# Patient Record
Sex: MALE | Race: WHITE | NOT HISPANIC OR LATINO | Employment: FULL TIME | URBAN - METROPOLITAN AREA
[De-identification: names, ages, dates, MRNs, and addresses within clinical notes are randomized per-mention and may not be internally consistent; named-entity substitution may affect disease eponyms.]

---

## 2017-04-19 ENCOUNTER — TELEPHONE (OUTPATIENT)
Dept: GASTROENTEROLOGY | Facility: CLINIC | Age: 62
End: 2017-04-19

## 2017-04-19 NOTE — TELEPHONE ENCOUNTER
----- Message from Charisma Rodriguez MD sent at 4/19/2017 12:59 PM EDT -----  Regarding: recall  C/s recall 7/2021

## 2019-02-13 ENCOUNTER — OFFICE VISIT (OUTPATIENT)
Dept: FAMILY MEDICINE CLINIC | Facility: CLINIC | Age: 64
End: 2019-02-13

## 2019-02-13 VITALS
BODY MASS INDEX: 26.68 KG/M2 | SYSTOLIC BLOOD PRESSURE: 126 MMHG | HEIGHT: 67 IN | WEIGHT: 170 LBS | OXYGEN SATURATION: 98 % | DIASTOLIC BLOOD PRESSURE: 78 MMHG | TEMPERATURE: 98.3 F | HEART RATE: 73 BPM

## 2019-02-13 DIAGNOSIS — Z11.59 ENCOUNTER FOR HEPATITIS C SCREENING TEST FOR LOW RISK PATIENT: ICD-10-CM

## 2019-02-13 DIAGNOSIS — Z00.00 HEALTH MAINTENANCE EXAMINATION: Primary | ICD-10-CM

## 2019-02-13 DIAGNOSIS — E55.9 HYPOVITAMINOSIS D: ICD-10-CM

## 2019-02-13 DIAGNOSIS — Z13.1 SCREENING FOR DIABETES MELLITUS: ICD-10-CM

## 2019-02-13 DIAGNOSIS — Z23 NEED FOR PNEUMOCOCCAL VACCINE: ICD-10-CM

## 2019-02-13 DIAGNOSIS — Z13.220 SCREENING FOR LIPID DISORDERS: ICD-10-CM

## 2019-02-13 PROBLEM — Z51.6 ALLERGY DESENSITIZATION THERAPY: Status: ACTIVE | Noted: 2019-02-13

## 2019-02-13 PROBLEM — K21.9 GASTROESOPHAGEAL REFLUX DISEASE WITHOUT ESOPHAGITIS: Status: ACTIVE | Noted: 2019-02-13

## 2019-02-13 PROBLEM — J45.22 MILD INTERMITTENT ASTHMA WITH STATUS ASTHMATICUS: Status: ACTIVE | Noted: 2019-02-13

## 2019-02-13 PROCEDURE — 90670 PCV13 VACCINE IM: CPT | Performed by: FAMILY MEDICINE

## 2019-02-13 PROCEDURE — 99397 PER PM REEVAL EST PAT 65+ YR: CPT | Performed by: FAMILY MEDICINE

## 2019-02-13 PROCEDURE — 90471 IMMUNIZATION ADMIN: CPT | Performed by: FAMILY MEDICINE

## 2019-02-13 NOTE — PROGRESS NOTES
Vito Rodriguez is a 63 y.o. male.     Chief Complaint   Patient presents with   • Establish Care     new pt establishing today with dr colindres   • Annual Exam       HPI     Pt is a pleasant 63 y.o. YO male here for Annual Exam.  PMH includes GERD well controlled with last EGD in 2016, Allergies with immune therapy, Mild intermittent asthma well controlled on Symbicort.    Health Maintenance   Topic Date Due   • TDAP/TD VACCINES (1 - Tdap) 06/23/1972   • ZOSTER VACCINE (1 of 2) 06/23/2003   • HEPATITIS C SCREENING  04/19/2017   • PNEUMOCOCCAL VACCINES (65+ LOW/MEDIUM RISK) (2 of 2 - PPSV23) 02/13/2020   • ANNUAL PHYSICAL  02/14/2020   • COLONOSCOPY  07/06/2021   • INFLUENZA VACCINE  Completed     Diet: Over all wants to eat healthy  Exercise: Enjoys being active.     The following portions of the patient's history were reviewed and updated as appropriate: allergies, current medications, past family history, past medical history, past social history, past surgical history and problem list.    Review of Systems   Constitutional: Negative.    HENT: Positive for congestion and postnasal drip.    Eyes: Negative.    Respiratory: Positive for cough.    Cardiovascular: Negative for chest pain, palpitations and leg swelling.   Gastrointestinal: Negative.    Genitourinary: Negative.    Musculoskeletal: Negative.    Allergic/Immunologic: Positive for environmental allergies.   Neurological: Negative.    Hematological: Negative.    Psychiatric/Behavioral: Negative.        Objective  Vitals:    02/13/19 1331   BP: 126/78   Pulse: 73   Temp: 98.3 °F (36.8 °C)   SpO2: 98%        Physical Exam   Constitutional: He is oriented to person, place, and time. He appears well-developed and well-nourished. No distress.   HENT:   Head: Normocephalic.   Nose: Nose normal.   Eyes: EOM are normal.   Cardiovascular: Normal rate, regular rhythm, normal heart sounds and intact distal pulses.   No murmur heard.  Pulmonary/Chest: Effort normal and  breath sounds normal. No respiratory distress.   Musculoskeletal: Normal range of motion.   Neurological: He is alert and oriented to person, place, and time.   Skin: Skin is warm and dry. No rash noted.   Psychiatric: He has a normal mood and affect. His behavior is normal. Judgment and thought content normal.   Nursing note and vitals reviewed.        Current Outpatient Medications:   •  budesonide-formoterol (SYMBICORT) 80-4.5 MCG/ACT inhaler, Inhale 2 puffs daily., Disp: , Rfl:   •  lansoprazole (PREVACID) 30 MG capsule, Take 30 mg by mouth daily., Disp: , Rfl:   •  montelukast (SINGULAIR) 10 MG tablet, Take 10 mg by mouth every night., Disp: , Rfl:     Procedures    Lab Results (most recent)     None              Vito was seen today for establish care and annual exam.    Diagnoses and all orders for this visit:    Health maintenance examination    Screening for lipid disorders  -     Lipid Panel    Screening for diabetes mellitus  -     Comprehensive Metabolic Panel    Hypovitaminosis D  -     Vitamin D 25 Hydroxy    Encounter for hepatitis C screening test for low risk patient  -     Hepatitis C Antibody    Need for pneumococcal vaccine  -     Pneumococcal Conjugate Vaccine 13-Valent All (PCV13)      Labs as above, Not due for C-Scope, goal to increase water intake, eats healthy over all.  He does enjoy 30 min on the elliptical and stair master, bike.  Does not run with knee OA.  Needs PNA vaccine today.      Return if symptoms worsen or fail to improve.      Tyesha Ritchie MD

## 2019-02-14 LAB
25(OH)D3+25(OH)D2 SERPL-MCNC: 21.2 NG/ML (ref 30–100)
ALBUMIN SERPL-MCNC: 4.6 G/DL (ref 3.6–4.8)
ALBUMIN/GLOB SERPL: 1.8 {RATIO} (ref 1.2–2.2)
ALP SERPL-CCNC: 73 IU/L (ref 39–117)
ALT SERPL-CCNC: 17 IU/L (ref 0–44)
AST SERPL-CCNC: 25 IU/L (ref 0–40)
BILIRUB SERPL-MCNC: 0.3 MG/DL (ref 0–1.2)
BUN SERPL-MCNC: 12 MG/DL (ref 8–27)
BUN/CREAT SERPL: 12 (ref 10–24)
CALCIUM SERPL-MCNC: 9.4 MG/DL (ref 8.6–10.2)
CHLORIDE SERPL-SCNC: 101 MMOL/L (ref 96–106)
CHOLEST SERPL-MCNC: 199 MG/DL (ref 100–199)
CO2 SERPL-SCNC: 26 MMOL/L (ref 20–29)
CREAT SERPL-MCNC: 1.02 MG/DL (ref 0.76–1.27)
GLOBULIN SER CALC-MCNC: 2.6 G/DL (ref 1.5–4.5)
GLUCOSE SERPL-MCNC: 94 MG/DL (ref 65–99)
HCV AB S/CO SERPL IA: <0.1 S/CO RATIO (ref 0–0.9)
HDLC SERPL-MCNC: 41 MG/DL
LDLC SERPL CALC-MCNC: 132 MG/DL (ref 0–99)
POTASSIUM SERPL-SCNC: 4.7 MMOL/L (ref 3.5–5.2)
PROT SERPL-MCNC: 7.2 G/DL (ref 6–8.5)
SODIUM SERPL-SCNC: 142 MMOL/L (ref 134–144)
TRIGL SERPL-MCNC: 128 MG/DL (ref 0–149)
VLDLC SERPL CALC-MCNC: 26 MG/DL (ref 5–40)

## 2020-10-28 ENCOUNTER — OFFICE VISIT (OUTPATIENT)
Dept: FAMILY MEDICINE CLINIC | Facility: CLINIC | Age: 65
End: 2020-10-28

## 2020-10-28 VITALS
HEIGHT: 67 IN | BODY MASS INDEX: 27.03 KG/M2 | TEMPERATURE: 97.3 F | HEART RATE: 82 BPM | SYSTOLIC BLOOD PRESSURE: 128 MMHG | WEIGHT: 172.2 LBS | DIASTOLIC BLOOD PRESSURE: 72 MMHG

## 2020-10-28 DIAGNOSIS — Z13.220 SCREENING FOR LIPID DISORDERS: ICD-10-CM

## 2020-10-28 DIAGNOSIS — Z12.5 SCREENING PSA (PROSTATE SPECIFIC ANTIGEN): ICD-10-CM

## 2020-10-28 DIAGNOSIS — Z23 NEED FOR INFLUENZA VACCINATION: ICD-10-CM

## 2020-10-28 DIAGNOSIS — Z23 NEED FOR PNEUMOCOCCAL VACCINATION: ICD-10-CM

## 2020-10-28 DIAGNOSIS — Z00.00 HEALTH MAINTENANCE EXAMINATION: Primary | ICD-10-CM

## 2020-10-28 DIAGNOSIS — E55.9 HYPOVITAMINOSIS D: ICD-10-CM

## 2020-10-28 DIAGNOSIS — Z13.1 SCREENING FOR DIABETES MELLITUS: ICD-10-CM

## 2020-10-28 DIAGNOSIS — Z13.0 SCREENING, ANEMIA, DEFICIENCY, IRON: ICD-10-CM

## 2020-10-28 PROCEDURE — 90472 IMMUNIZATION ADMIN EACH ADD: CPT | Performed by: FAMILY MEDICINE

## 2020-10-28 PROCEDURE — 90471 IMMUNIZATION ADMIN: CPT | Performed by: FAMILY MEDICINE

## 2020-10-28 PROCEDURE — 99397 PER PM REEVAL EST PAT 65+ YR: CPT | Performed by: FAMILY MEDICINE

## 2020-10-28 PROCEDURE — 90732 PPSV23 VACC 2 YRS+ SUBQ/IM: CPT | Performed by: FAMILY MEDICINE

## 2020-10-28 PROCEDURE — 90694 VACC AIIV4 NO PRSRV 0.5ML IM: CPT | Performed by: FAMILY MEDICINE

## 2020-10-28 NOTE — PROGRESS NOTES
Vito Rodriguez is a 65 y.o. male.     Chief Complaint   Patient presents with   • Annual Exam     Patient is here for his yearly check up he is not fasting. He is past due to his second pneumonia vaccine ( wore mask and goggles)        HPI     Pt is a pleasant 65 y.o. YO male here for     Health Maintenance   Topic Date Due   • TDAP/TD VACCINES (1 - Tdap) 06/23/1972   • ZOSTER VACCINE (1 of 2) 06/23/2003   • INFLUENZA VACCINE  08/01/2020   • COLONOSCOPY  07/06/2021   • ANNUAL PHYSICAL  10/29/2021   • Pneumococcal Vaccine 65+ (2 of 2 - PPSV23) 10/28/2025   • HEPATITIS C SCREENING  Completed       Diet: eats healthy and a lot of veggies, chicken and fish  Exercise: active  Colon cancer UTD  Immunizations: Due for PNA and flu    Some chest pain, SOA, palpitations   Rare ETOH, no smoking    The following portions of the patient's history were reviewed and updated as appropriate: allergies, current medications, past family history, past medical history, past social history, past surgical history and problem list.    Review of Systems   Constitutional: Negative for activity change and appetite change.   HENT: Negative for congestion and dental problem.    Eyes: Negative for discharge and itching.   Respiratory: Negative for apnea and chest tightness.    Cardiovascular: Negative for chest pain, palpitations and leg swelling.   Gastrointestinal: Negative for abdominal distention and abdominal pain.   Endocrine: Negative for cold intolerance and heat intolerance.   Genitourinary: Negative for difficulty urinating and dysuria.   Musculoskeletal: Negative for arthralgias and back pain.   Skin: Negative for color change and pallor.   Allergic/Immunologic: Negative for environmental allergies and food allergies.   Neurological: Negative for dizziness and facial asymmetry.   Hematological: Negative for adenopathy. Does not bruise/bleed easily.   Psychiatric/Behavioral: Negative for agitation and behavioral problems.        Objective  Vitals:    10/28/20 0810   BP: 128/72   Pulse: 82   Temp: 97.3 °F (36.3 °C)        Physical Exam  Vitals signs and nursing note reviewed.   Constitutional:       General: He is not in acute distress.     Appearance: He is well-developed.   HENT:      Head: Normocephalic.      Nose: Nose normal.   Cardiovascular:      Rate and Rhythm: Normal rate and regular rhythm.      Heart sounds: Normal heart sounds. No murmur.   Pulmonary:      Effort: Pulmonary effort is normal. No respiratory distress.      Breath sounds: Normal breath sounds.   Musculoskeletal: Normal range of motion.   Skin:     General: Skin is warm and dry.      Findings: No rash.   Neurological:      Mental Status: He is alert and oriented to person, place, and time.   Psychiatric:         Behavior: Behavior normal.         Thought Content: Thought content normal.         Judgment: Judgment normal.           Current Outpatient Medications:   •  budesonide-formoterol (SYMBICORT) 80-4.5 MCG/ACT inhaler, Inhale 2 puffs daily., Disp: , Rfl:   •  lansoprazole (PREVACID) 30 MG capsule, Take 30 mg by mouth daily., Disp: , Rfl:   •  montelukast (SINGULAIR) 10 MG tablet, Take 10 mg by mouth every night., Disp: , Rfl:     Procedures    Lab Results (most recent)     None              Diagnoses and all orders for this visit:    1. Health maintenance examination (Primary)    2. Screening for lipid disorders  -     Lipid Panel    3. Screening for diabetes mellitus  -     Comprehensive Metabolic Panel    4. Screening PSA (prostate specific antigen)  -     PSA Screen    5. Hypovitaminosis D  -     Vitamin D 25 Hydroxy    6. Screening, anemia, deficiency, iron  -     CBC & Differential    7. Need for pneumococcal vaccination  -     Pneumococcal Polysaccharide Vaccine 23-Valent Greater Than or Equal To 1yo Subcutaneous / IM    8. Need for influenza vaccination  -     Fluad Quad 65+ yrs ([id number])    Here for annual exam, fasting labs ordered as above,  immunizations up-to-date, colon cancer screening as above, cardiovascular screening  Negative for symptoms, counseled patient to increase vegetable intake, water and 150 minutes of exercise weekly combining weightbearing exercises and aerobic activity.      Return in 1 year (on 10/28/2021), or if symptoms worsen or fail to improve, for Annual physical.      Tyesha Ritchie MD    Mask and protective eyewear worn by myself and medical assistant during entire patient encounter.

## 2021-01-12 ENCOUNTER — TELEPHONE (OUTPATIENT)
Dept: FAMILY MEDICINE CLINIC | Facility: CLINIC | Age: 66
End: 2021-01-12

## 2023-12-04 ENCOUNTER — TRANSCRIBE ORDERS (OUTPATIENT)
Dept: ADMINISTRATIVE | Facility: HOSPITAL | Age: 68
End: 2023-12-04
Payer: MEDICARE

## 2023-12-04 DIAGNOSIS — Z13.6 ENCOUNTER FOR SCREENING FOR VASCULAR DISEASE: Primary | ICD-10-CM

## 2023-12-06 ENCOUNTER — HOSPITAL ENCOUNTER (OUTPATIENT)
Dept: GENERAL RADIOLOGY | Facility: HOSPITAL | Age: 68
Discharge: HOME | End: 2023-12-06
Admitting: ALLERGY & IMMUNOLOGY
Payer: MEDICARE

## 2023-12-06 ENCOUNTER — TRANSCRIBE ORDERS (OUTPATIENT)
Dept: ADMINISTRATIVE | Facility: HOSPITAL | Age: 68
End: 2023-12-06
Payer: MEDICARE

## 2023-12-06 DIAGNOSIS — J45.40 MODERATE PERSISTENT ASTHMA, UNCOMPLICATED: Primary | ICD-10-CM

## 2023-12-06 DIAGNOSIS — J30.1 ALLERGIC RHINITIS DUE TO POLLEN, UNSPECIFIED SEASONALITY: ICD-10-CM

## 2023-12-06 DIAGNOSIS — J45.40 MODERATE PERSISTENT ASTHMA, UNCOMPLICATED: ICD-10-CM

## 2023-12-06 DIAGNOSIS — K21.9 GASTROESOPHAGEAL REFLUX DISEASE WITHOUT ESOPHAGITIS: ICD-10-CM

## 2023-12-06 DIAGNOSIS — J38.7 OTHER DISEASES OF LARYNX: ICD-10-CM

## 2023-12-06 DIAGNOSIS — J30.89 OTHER ALLERGIC RHINITIS: ICD-10-CM

## 2023-12-06 PROCEDURE — 71046 X-RAY EXAM CHEST 2 VIEWS: CPT

## 2023-12-22 ENCOUNTER — TELEPHONE (OUTPATIENT)
Dept: GASTROENTEROLOGY | Facility: CLINIC | Age: 68
End: 2023-12-22
Payer: MEDICARE

## 2023-12-22 ENCOUNTER — OFFICE VISIT (OUTPATIENT)
Dept: GASTROENTEROLOGY | Facility: CLINIC | Age: 68
End: 2023-12-22
Payer: MEDICARE

## 2023-12-22 VITALS
WEIGHT: 173.2 LBS | HEART RATE: 56 BPM | BODY MASS INDEX: 27.18 KG/M2 | OXYGEN SATURATION: 98 % | HEIGHT: 67 IN | TEMPERATURE: 97.7 F

## 2023-12-22 DIAGNOSIS — R13.19 ESOPHAGEAL DYSPHAGIA: Primary | ICD-10-CM

## 2023-12-22 DIAGNOSIS — Z86.010 PERSONAL HISTORY OF COLONIC POLYPS: ICD-10-CM

## 2023-12-22 DIAGNOSIS — R10.2 MALE PELVIC PAIN: ICD-10-CM

## 2023-12-22 PROBLEM — Z86.0100 PERSONAL HISTORY OF COLONIC POLYPS: Status: ACTIVE | Noted: 2023-12-22

## 2023-12-22 RX ORDER — PANTOPRAZOLE SODIUM 40 MG/1
40 TABLET, DELAYED RELEASE ORAL 2 TIMES DAILY
COMMUNITY

## 2023-12-22 RX ORDER — BROMPHENIRAMINE MALEATE, PSEUDOEPHEDRINE HYDROCHLORIDE, AND DEXTROMETHORPHAN HYDROBROMIDE 2; 30; 10 MG/5ML; MG/5ML; MG/5ML
SYRUP ORAL
COMMUNITY
Start: 2023-11-07

## 2023-12-22 RX ORDER — BENZONATATE 200 MG/1
CAPSULE ORAL
COMMUNITY
Start: 2023-11-07

## 2023-12-22 NOTE — PROGRESS NOTES
"Chief Complaint  Heartburn and Difficulty Swallowing    Subjective          History Of Present Illness:    Vito Rodriguez is a  68 y.o. male patient of Dr. Rodriguez with a history of dysphagia, iron deficiency anemia, colon polyps who presents today in the setting of dysphagia.    Patient has substernal chest pain primarily with coughing.  Reports that the pain has improved recently.  Had a nuclear stress test in January of this year which was normal patient had evaluation with chest x-ray which was normal.  He does have a remote history of COVID infection and follows with a allergist for asthma and is currently on inhalers and Singulair. Patient reports dysphagia with solid foods.  No issue with liquids.  Patient has had to vomit up the food occasionally to clear it. Patient reports he does have heartburn occasionally. Patient is on pantoprazole daily.    Patient additionally reports right pelvic discomfort. No significant pain. He does have a history of an inguinal hernia repair in this region.  He does report some bulging in the left pelvic region with coughing or straining.    Additional data reviewed:   EGD 2016 -normal duodenum, gastritis.  Normal duodenal biopsies.  Gastric biopsies with mild chronic inflammation.  No H. pylori.  C-scope 2016 -diverticulosis of the sigmoid colon nonbleeding internal hemorrhoids. Recall 5 years    Objective   Vital Signs:   Pulse 56   Temp 97.7 °F (36.5 °C)   Ht 168.9 cm (66.5\")   Wt 78.6 kg (173 lb 3.2 oz)   SpO2 98%   BMI 27.54 kg/m²       Physical Exam  Vitals reviewed.   Constitutional:       General: He is not in acute distress.     Appearance: Normal appearance. He is not ill-appearing.   HENT:      Head: Normocephalic and atraumatic.      Nose: Nose normal.      Mouth/Throat:      Pharynx: Oropharynx is clear.   Eyes:      Extraocular Movements: Extraocular movements intact.      Conjunctiva/sclera: Conjunctivae normal.      Pupils: Pupils are equal, round, and " reactive to light.   Pulmonary:      Effort: Pulmonary effort is normal.   Abdominal:      General: There is no distension.      Palpations: Abdomen is soft. There is no mass.      Tenderness: There is no abdominal tenderness.      Hernia: Hernia is present in the umbilical area.   Musculoskeletal:         General: No swelling. Normal range of motion.      Cervical back: Normal range of motion.   Skin:     General: Skin is warm and dry.      Findings: No bruising or rash.   Neurological:      General: No focal deficit present.      Mental Status: He is alert and oriented to person, place, and time.      Motor: No weakness.      Gait: Gait normal.   Psychiatric:         Mood and Affect: Mood normal.          Result Review :   The following data was reviewed by: Claribel Carvajal PA-C on 12/22/2023:        Assessment and Plan    Diagnoses and all orders for this visit:    1. Esophageal dysphagia (Primary)  -     Case Request; Standing  -     Implement Anesthesia orders day of procedure.; Standing  -     Obtain informed consent; Standing  -     Verify bowel prep was successful; Standing  -     Give tap water enema if bowel prep was insufficient; Standing  -     Case Request    2. Personal history of colonic polyps  -     Case Request; Standing  -     Implement Anesthesia orders day of procedure.; Standing  -     Obtain informed consent; Standing  -     Verify bowel prep was successful; Standing  -     Give tap water enema if bowel prep was insufficient; Standing  -     Case Request    3. Male pelvic pain  -     US Pelvis Complete; Future       Proceed with EGD and colonoscopy with Dr. Rodriguez in the setting of his esophageal dysphagia and story of colon polyps.  Continue pantoprazole twice daily  Obtain pelvic ultrasound for evaluation of inguinal hernia.  May need referral to general surgery for further evaluation.    Follow Up   Return for EGD/Colonoscopy.    Dragon dictation used throughout this note.             Claribel Hudson PA-C   [facility]  [address] [place] [address]  Office: [phone]

## 2024-01-03 ENCOUNTER — TELEPHONE (OUTPATIENT)
Dept: GASTROENTEROLOGY | Facility: CLINIC | Age: 69
End: 2024-01-03
Payer: MEDICARE

## 2024-01-03 DIAGNOSIS — R10.2 MALE PELVIC PAIN: Primary | ICD-10-CM

## 2024-01-03 NOTE — TELEPHONE ENCOUNTER
There is an order in for US Pelvic Complete but they can not schedule because  An US Vascular Limited Testicular/Ovarian ([id number]) MUST ALSO BE ORDERED for pain as diagnosis if patient over 15yo.

## 2024-01-10 ENCOUNTER — TELEPHONE (OUTPATIENT)
Dept: GASTROENTEROLOGY | Facility: CLINIC | Age: 69
End: 2024-01-10

## 2024-01-10 DIAGNOSIS — K40.90 INGUINAL HERNIA WITHOUT OBSTRUCTION OR GANGRENE, RECURRENCE NOT SPECIFIED, UNSPECIFIED LATERALITY: ICD-10-CM

## 2024-01-10 DIAGNOSIS — R10.2 MALE PELVIC PAIN: Primary | ICD-10-CM

## 2024-01-10 NOTE — TELEPHONE ENCOUNTER
Called Nandini back and she reports [facility] if they are evaluating a hernia , a ct scan should be ordered not us.  She is asking how do we want to proceed.  Pam kwan send message to Claribel HOLMAN.

## 2024-01-10 NOTE — TELEPHONE ENCOUNTER
Its for evaluation for an inguinal hernia which US can be used. If they still do not agree then we can proceed with CT abdomen and pelvis.

## 2024-01-10 NOTE — TELEPHONE ENCOUNTER
Nandini called and advised of Claribel HOLMAN's note. Verb understanding.  She states they will want a ct scan abd pelvis.    Order placed and update sent to Claribel HOLMAN.

## 2024-01-26 ENCOUNTER — HOSPITAL ENCOUNTER (OUTPATIENT)
Facility: SURGERY CENTER | Age: 69
Setting detail: HOSPITAL OUTPATIENT SURGERY
Discharge: HOME | End: 2024-01-26
Attending: INTERNAL MEDICINE | Admitting: INTERNAL MEDICINE
Payer: MEDICARE

## 2024-01-26 ENCOUNTER — ANESTHESIA EVENT (OUTPATIENT)
Dept: SURGERY | Facility: SURGERY CENTER | Age: 69
End: 2024-01-26
Payer: MEDICARE

## 2024-01-26 ENCOUNTER — ANESTHESIA (OUTPATIENT)
Dept: SURGERY | Facility: SURGERY CENTER | Age: 69
End: 2024-01-26
Payer: MEDICARE

## 2024-01-26 VITALS
HEIGHT: 67 IN | DIASTOLIC BLOOD PRESSURE: 72 MMHG | TEMPERATURE: 97.8 F | RESPIRATION RATE: 16 BRPM | HEART RATE: 56 BPM | OXYGEN SATURATION: 98 % | BODY MASS INDEX: 26.37 KG/M2 | SYSTOLIC BLOOD PRESSURE: 116 MMHG | WEIGHT: 168 LBS

## 2024-01-26 DIAGNOSIS — R13.19 ESOPHAGEAL DYSPHAGIA: ICD-10-CM

## 2024-01-26 DIAGNOSIS — Z86.010 PERSONAL HISTORY OF COLONIC POLYPS: ICD-10-CM

## 2024-01-26 PROCEDURE — 43239 EGD BIOPSY SINGLE/MULTIPLE: CPT | Performed by: INTERNAL MEDICINE

## 2024-01-26 PROCEDURE — 25810000003 LACTATED RINGERS PER 1000 ML: Performed by: STUDENT IN AN ORGANIZED HEALTH CARE EDUCATION/TRAINING PROGRAM

## 2024-01-26 PROCEDURE — 25010000002 PROPOFOL 10 MG/ML EMULSION: Performed by: STUDENT IN AN ORGANIZED HEALTH CARE EDUCATION/TRAINING PROGRAM

## 2024-01-26 PROCEDURE — S0260 H&P FOR SURGERY: HCPCS | Performed by: INTERNAL MEDICINE

## 2024-01-26 PROCEDURE — 43249 ESOPH EGD DILATION <30 MM: CPT | Performed by: INTERNAL MEDICINE

## 2024-01-26 PROCEDURE — 45380 COLONOSCOPY AND BIOPSY: CPT | Performed by: INTERNAL MEDICINE

## 2024-01-26 PROCEDURE — 25010000002 PROPOFOL 200 MG/20ML EMULSION: Performed by: STUDENT IN AN ORGANIZED HEALTH CARE EDUCATION/TRAINING PROGRAM

## 2024-01-26 PROCEDURE — C1726 CATH, BAL DIL, NON-VASCULAR: HCPCS | Performed by: INTERNAL MEDICINE

## 2024-01-26 PROCEDURE — 25810000003 LACTATED RINGERS PER 1000 ML: Performed by: INTERNAL MEDICINE

## 2024-01-26 PROCEDURE — 88305 TISSUE EXAM BY PATHOLOGIST: CPT | Performed by: INTERNAL MEDICINE

## 2024-01-26 RX ORDER — SODIUM CHLORIDE, SODIUM LACTATE, POTASSIUM CHLORIDE, CALCIUM CHLORIDE 600; 310; 30; 20 MG/100ML; MG/100ML; MG/100ML; MG/100ML
1000 INJECTION, SOLUTION INTRAVENOUS CONTINUOUS
Status: DISCONTINUED | OUTPATIENT
Start: 2024-01-26 | End: 2024-01-26 | Stop reason: HOSPADM

## 2024-01-26 RX ORDER — LIDOCAINE HYDROCHLORIDE 10 MG/ML
0.5 INJECTION, SOLUTION INFILTRATION; PERINEURAL ONCE AS NEEDED
Status: DISCONTINUED | OUTPATIENT
Start: 2024-01-26 | End: 2024-01-26 | Stop reason: HOSPADM

## 2024-01-26 RX ORDER — MAGNESIUM HYDROXIDE 1200 MG/15ML
LIQUID ORAL AS NEEDED
Status: DISCONTINUED | OUTPATIENT
Start: 2024-01-26 | End: 2024-01-26 | Stop reason: HOSPADM

## 2024-01-26 RX ORDER — LIDOCAINE HYDROCHLORIDE 20 MG/ML
INJECTION, SOLUTION EPIDURAL; INFILTRATION; INTRACAUDAL; PERINEURAL AS NEEDED
Status: DISCONTINUED | OUTPATIENT
Start: 2024-01-26 | End: 2024-01-26 | Stop reason: SURG

## 2024-01-26 RX ORDER — SODIUM CHLORIDE, SODIUM LACTATE, POTASSIUM CHLORIDE, CALCIUM CHLORIDE 600; 310; 30; 20 MG/100ML; MG/100ML; MG/100ML; MG/100ML
INJECTION, SOLUTION INTRAVENOUS CONTINUOUS PRN
Status: DISCONTINUED | OUTPATIENT
Start: 2024-01-26 | End: 2024-01-26 | Stop reason: SURG

## 2024-01-26 RX ORDER — FLUTICASONE FUROATE, UMECLIDINIUM BROMIDE AND VILANTEROL TRIFENATATE 100; 62.5; 25 UG/1; UG/1; UG/1
1 POWDER RESPIRATORY (INHALATION)
COMMUNITY
Start: 2023-11-01

## 2024-01-26 RX ORDER — SODIUM CHLORIDE 0.9 % (FLUSH) 0.9 %
10 SYRINGE (ML) INJECTION AS NEEDED
Status: DISCONTINUED | OUTPATIENT
Start: 2024-01-26 | End: 2024-01-26 | Stop reason: HOSPADM

## 2024-01-26 RX ORDER — PROPOFOL 10 MG/ML
INJECTION, EMULSION INTRAVENOUS AS NEEDED
Status: DISCONTINUED | OUTPATIENT
Start: 2024-01-26 | End: 2024-01-26 | Stop reason: SURG

## 2024-01-26 RX ADMIN — PROPOFOL INJECTABLE EMULSION 160 MCG/KG/MIN: 10 INJECTION, EMULSION INTRAVENOUS at 11:32

## 2024-01-26 RX ADMIN — SODIUM CHLORIDE, POTASSIUM CHLORIDE, SODIUM LACTATE AND CALCIUM CHLORIDE 1000 ML: 600; 310; 30; 20 INJECTION, SOLUTION INTRAVENOUS at 11:07

## 2024-01-26 RX ADMIN — PROPOFOL 100 MG: 10 INJECTION, EMULSION INTRAVENOUS at 11:32

## 2024-01-26 RX ADMIN — LIDOCAINE HYDROCHLORIDE 30 MG: 20 INJECTION, SOLUTION EPIDURAL; INFILTRATION; INTRACAUDAL; PERINEURAL at 11:32

## 2024-01-26 RX ADMIN — SODIUM CHLORIDE, SODIUM LACTATE, POTASSIUM CHLORIDE, AND CALCIUM CHLORIDE: .6; .31; .03; .02 INJECTION, SOLUTION INTRAVENOUS at 11:31

## 2024-01-26 NOTE — ANESTHESIA POSTPROCEDURE EVALUATION
Patient: Vito Rodriguez    Procedure Summary       Date: 01/26/24 Room / Location: [facility] OR [facility] / [facility]    Anesthesia Start: 1131 Anesthesia Stop: 1205    Procedures:       COLONOSCOPY TO CECUM WITH POLYPECTOMY      ESOPHAGOGASTRODUODENOSCOPY WITH BIOPSY AND DILATION (Esophagus) Diagnosis:       Esophageal dysphagia      Personal history of colonic polyps      (Esophageal dysphagia [R13.19])      (Personal history of colonic polyps [Z86.010])    Surgeons: Charisma Rodriguez MD Provider: Marcio Jaime MD    Anesthesia Type: MAC ASA Status: 2            Anesthesia Type: MAC    Vitals  Vitals Value Taken Time   /70 01/26/24 1203   Temp 36.6 °C (97.8 °F) 01/26/24 1203   Pulse 60 01/26/24 1203   Resp 16 01/26/24 1203   SpO2 97 % 01/26/24 1203           Post Anesthesia Care and Evaluation    Patient location during evaluation: bedside  Patient participation: complete - patient participated  Level of consciousness: awake and alert  Pain management: adequate    Airway patency: patent  Anesthetic complications: No anesthetic complications  PONV Status: controlled  Cardiovascular status: blood pressure returned to baseline and acceptable  Respiratory status: acceptable  Hydration status: acceptable

## 2024-01-26 NOTE — ANESTHESIA PREPROCEDURE EVALUATION
Anesthesia Evaluation     Patient summary reviewed and Nursing notes reviewed   no history of anesthetic complications:   NPO Solid Status: > 8 hours  NPO Liquid Status: > 2 hours           Airway   Dental      Pulmonary    (+) asthma,  Cardiovascular         Neuro/Psych  GI/Hepatic/Renal/Endo    (+) GERD    Musculoskeletal     Abdominal    Substance History      OB/GYN          Other                          Anesthesia Plan    ASA 2     MAC     intravenous induction     Anesthetic plan, risks, benefits, and alternatives have been provided, discussed and informed consent has been obtained with: patient.        CODE STATUS:

## 2024-01-29 LAB
LAB AP CASE REPORT: NORMAL
LAB AP CLINICAL INFORMATION: NORMAL
PATH REPORT.FINAL DX SPEC: NORMAL
PATH REPORT.GROSS SPEC: NORMAL

## 2024-01-31 ENCOUNTER — HOSPITAL ENCOUNTER (OUTPATIENT)
Facility: HOSPITAL | Age: 69
Discharge: HOME | End: 2024-01-31
Admitting: PHYSICIAN ASSISTANT
Payer: MEDICARE

## 2024-01-31 PROCEDURE — 74177 CT ABD & PELVIS W/CONTRAST: CPT

## 2024-01-31 PROCEDURE — 25510000001 IOPAMIDOL 61 % SOLUTION: Performed by: PHYSICIAN ASSISTANT

## 2024-01-31 PROCEDURE — 0 DIATRIZOATE MEGLUMINE & SODIUM PER 1 ML: Performed by: PHYSICIAN ASSISTANT

## 2024-01-31 RX ADMIN — DIATRIZOATE MEGLUMINE AND DIATRIZOATE SODIUM 30 ML: 600; 100 SOLUTION ORAL; RECTAL at 16:18

## 2024-01-31 RX ADMIN — IOPAMIDOL 85 ML: 612 INJECTION, SOLUTION INTRAVENOUS at 17:45

## 2024-02-02 ENCOUNTER — TELEPHONE (OUTPATIENT)
Dept: GASTROENTEROLOGY | Facility: CLINIC | Age: 69
End: 2024-02-02
Payer: MEDICARE

## 2024-02-02 DIAGNOSIS — K40.90 LEFT INGUINAL HERNIA: Primary | ICD-10-CM

## 2024-02-02 NOTE — TELEPHONE ENCOUNTER
Called pt and advised of Claribel HOLMAN's note. Verb understanding.     Pt reports he would like the referral to a surgeon to see what they would have to say. Pt state she would like to do more strenuous exercise but would like to talk with surgeon.  Message sent to Claribel HOLMAN to place referral.

## 2024-02-02 NOTE — TELEPHONE ENCOUNTER
----- Message from Claribel Hudson PA-C sent at 2/2/2024  8:20 AM EST -----  Please let the patient know that his CT scan shows left inguinal hernia.  The bowel that is in the hernia is not thickened or dilated which is reassuring.  If he feels that it is causing him problems we can refer him to general surgery.  It also does mention some wall thickening of the stomach which was visualized directly on his upper endoscopy  performed by Dr. Rodriguez.  He is currently on the appropriate medication with pantoprazole in regard to that.

## 2024-02-02 NOTE — PROGRESS NOTES
Mild nonspecific inflammation seen on stomach biopsy.    This can cause some discomfort albeit more abdominal and I do not think it was cause the chest discomfort you experience with sneezing or coughing.  I expect this is more musculoskeletal vs. Pulmonary as the esophagus appeared normal on exam.  With esophageal stenosis, there is evidence of acid reflux but it would not cause the type of pain described.  Continue pantoprazole.  Recommend diet and lifestyle modification to reduce acid reflux symptoms as well such as eating small meals, reducing fat caffeine and alcohol in the diet, avoid eating close to bedtime, eliminate excess weight if applicable.    The polyp(s) showed hyperplastic change, which is non-cancerous and not pre-cancerous. Follow-up colonoscopy in 5 years is advised.

## 2024-02-02 NOTE — PROGRESS NOTES
Please let the patient know that his CT scan shows left inguinal hernia.  The bowel that is in the hernia is not thickened or dilated which is reassuring.  If he feels that it is causing him problems we can refer him to general surgery.  It also does mention some wall thickening of the stomach which was visualized directly on his upper endoscopy  performed by Dr. Rodriguez.  He is currently on the appropriate medication with pantoprazole in regard to that.

## 2024-02-05 ENCOUNTER — TELEPHONE (OUTPATIENT)
Dept: GASTROENTEROLOGY | Facility: CLINIC | Age: 69
End: 2024-02-05
Payer: MEDICARE

## 2024-02-05 NOTE — TELEPHONE ENCOUNTER
Pt reviewed results and Dr Ocampo' note and recommendations via [employer].     Colonoscopy placed in  and recall for 1/26/29.

## 2024-02-05 NOTE — TELEPHONE ENCOUNTER
----- Message from Charisma Rodriguez MD sent at 2/2/2024  2:54 PM EST -----  Mild nonspecific inflammation seen on stomach biopsy.    This can cause some discomfort albeit more abdominal and I do not think it was cause the chest discomfort you experience with sneezing or coughing.  I expect this is more musculoskeletal vs. Pulmonary as the esophagus appeared normal on exam.  With esophageal stenosis, there is evidence of acid reflux but it would not cause the type of pain described.  Continue pantoprazole.  Recommend diet and lifestyle modification to reduce acid reflux symptoms as well such as eating small meals, reducing fat caffeine and alcohol in the diet, avoid eating close to bedtime, eliminate excess weight if applicable.    The polyp(s) showed hyperplastic change, which is non-cancerous and not pre-cancerous. Follow-up colonoscopy in 5 years is advised.

## 2024-02-08 ENCOUNTER — OFFICE VISIT (OUTPATIENT)
Dept: SURGERY | Facility: CLINIC | Age: 69
End: 2024-02-08
Payer: MEDICARE

## 2024-02-08 VITALS
HEIGHT: 67 IN | DIASTOLIC BLOOD PRESSURE: 78 MMHG | WEIGHT: 173.8 LBS | BODY MASS INDEX: 27.28 KG/M2 | SYSTOLIC BLOOD PRESSURE: 118 MMHG

## 2024-02-08 DIAGNOSIS — K40.90 LEFT INGUINAL HERNIA: Primary | ICD-10-CM

## 2024-02-08 PROCEDURE — 1160F RVW MEDS BY RX/DR IN RCRD: CPT | Performed by: SURGERY

## 2024-02-08 PROCEDURE — 99203 OFFICE O/P NEW LOW 30 MIN: CPT | Performed by: SURGERY

## 2024-02-08 PROCEDURE — 1159F MED LIST DOCD IN RCRD: CPT | Performed by: SURGERY

## 2024-02-08 NOTE — LETTER
February 8, 2024     Adolph Dickey MD     Patient: Vito Rodriguez   YOB: 1955   Date of Visit: 2/8/2024     Dear Adolph Dickey MD:       Thank you for referring Vito Rodriguez to me for evaluation. Below are the relevant portions of my assessment and plan of care.    If you have questions, please do not hesitate to call me. I look forward to following Vito along with you.         Sincerely,        Smith Lamb Jr., MD        CC:   No Recipients    Smith Lamb Jr., MD  02/08/24 1642  Sign when Signing Visit  Subjective  Vito Rodriguez is a 68 y.o. male who presents to the office in surgical consultation from Adolph Dickey MD and [facility] Soria*for a left inguinal hernia.    History of present illness  The patient has a several year history of a bulge in the left groin that was small and asymptomatic.  Over the past several months it has gotten larger and is frequently noticeable, especially with activity.  He has some discomfort with a sensation of pressure when the bulge is most prominent with activity.  He has not had any change in his bowel or bladder function.  He has had a previous right inguinal hernia repair and has no problems in his right groin.    Review of Systems   Constitutional:  Negative for fatigue and fever.   Respiratory:  Negative for chest tightness and shortness of breath.    Cardiovascular:  Negative for chest pain and palpitations.   Gastrointestinal:  Positive for abdominal pain. Negative for blood in stool, constipation, diarrhea, nausea and vomiting.     Past Medical History:   Diagnosis Date   • Asthma    • Colon polyp    • Diverticulitis of colon 1/25/24    Recent colonocopy by Dr. Rodriguez   • GERD (gastroesophageal reflux disease)    • Hx of colonic polyp      Past Surgical History:   Procedure Laterality Date   • COLONOSCOPY N/A 7/6/2016    Procedure: COLONOSCOPY to cecum;  Surgeon: Charisma Rodriguez MD;  Location: [facility] ENDOSCOPY;  Service:     • COLONOSCOPY N/A 2024    Procedure: COLONOSCOPY TO CECUM WITH POLYPECTOMY;  Surgeon: Charisma Rodriguez MD;  Location: [facility] [place] OR;  Service: Gastroenterology;  Laterality: N/A;  diverticulosis, polyp   • COLONOSCOPY W/ POLYPECTOMY     • ELBOW OPEN REDUCTION INTERNAL FIXATION Left     with one pin   • ENDOSCOPY N/A 2016    Procedure: ESOPHAGOGASTRODUODENOSCOPY with biopsies;  Surgeon: Charisma Rodriguez MD;  Location: [facility] ENDOSCOPY;  Service:    • ENDOSCOPY N/A 2024    Procedure: ESOPHAGOGASTRODUODENOSCOPY WITH BIOPSY AND DILATION;  Surgeon: Charisma Rodriguez MD;  Location: [facility] [place]IN OR;  Service: Gastroenterology;  Laterality: N/A;  15-18mm balloon used. esophageal stricture, duodenitis, abnormal gastric mucosa   • INGUINAL HERNIA REPAIR Right     12 y/o   • MENISCECTOMY Right      Family History   Problem Relation Age of Onset   • Diabetes Mother         Type 2   • Stroke Mother         hemorrhagic    • Dementia Mother    • Heart disease Father         smoker   • Alzheimer's disease Father 94   • Diabetes Father         Type 2   • Stroke Father    • Anxiety disorder Father    • No Known Problems Sister    • No Known Problems Brother    • Other Maternal Aunt 50        smoker, unknown   • Cirrhosis Brother    • Alcohol abuse Brother          age 72 from cirrosis   • No Known Problems Brother    • Prostate cancer Neg Hx    • Colon cancer Neg Hx    • Breast cancer Neg Hx      Social History     Socioeconomic History   • Marital status:    Tobacco Use   • Smoking status: Never   • Smokeless tobacco: Never   Vaping Use   • Vaping Use: Never used   Substance and Sexual Activity   • Alcohol use: Yes     Comment: Wine or Beer 1-2 per week   • Drug use: No   • Sexual activity: Yes     Partners: Female     Comment: Heterosexual       Objective  Physical Exam  Constitutional:       Appearance: Normal appearance. He is well-developed. He is not toxic-appearing.   Eyes:      General: No  scleral icterus.  Pulmonary:      Effort: Pulmonary effort is normal. No respiratory distress.   Abdominal:      Palpations: Abdomen is soft.      Tenderness: There is no abdominal tenderness.      Hernia: Hernia is present in the left inguinal area. There is no hernia in the right inguinal area.      Comments: There is a moderately sized left inguinal hernia that is reducible and contains bowel.   Skin:     General: Skin is warm and dry.   Neurological:      Mental Status: He is alert and oriented to person, place, and time.   Psychiatric:         Behavior: Behavior normal.         Thought Content: Thought content normal.         Judgment: Judgment normal.              Assessment & Plan    1.  Left inguinal hernia: The patient has a left inguinal hernia that is moderate in size and appears to contain bowel.  It is at risk of becoming incarcerated.  It was discussed with him.  Approaches to inguinal hernia repair were also discussed with him.  He will be scheduled for da Suleiman robot-assisted laparoscopic left inguinal hernia repair, possible bilateral inguinal hernia repairs.  The patient understands the indications, alternatives, risks, and benefits of the procedure and wishes to proceed.

## 2024-02-08 NOTE — PROGRESS NOTES
Raymond Rodriguez is a 68 y.o. male who presents to the office in surgical consultation from Adolph Dickey MD and RIVER Soria*for a left inguinal hernia.    History of present illness  The patient has a several year history of a bulge in the left groin that was small and asymptomatic.  Over the past several months it has gotten larger and is frequently noticeable, especially with activity.  He has some discomfort with a sensation of pressure when the bulge is most prominent with activity.  He has not had any change in his bowel or bladder function.  He has had a previous right inguinal hernia repair and has no problems in his right groin.    Review of Systems   Constitutional:  Negative for fatigue and fever.   Respiratory:  Negative for chest tightness and shortness of breath.    Cardiovascular:  Negative for chest pain and palpitations.   Gastrointestinal:  Positive for abdominal pain. Negative for blood in stool, constipation, diarrhea, nausea and vomiting.     Past Medical History:   Diagnosis Date    Asthma     Colon polyp     Diverticulitis of colon 1/25/24    Recent colonocopy by Dr. Rodriguez    GERD (gastroesophageal reflux disease)     Hx of colonic polyp      Past Surgical History:   Procedure Laterality Date    COLONOSCOPY N/A 7/6/2016    Procedure: COLONOSCOPY to cecum;  Surgeon: Charisma Rodriguez MD;  Location: [facility] ENDOSCOPY;  Service:     COLONOSCOPY N/A 1/26/2024    Procedure: COLONOSCOPY TO CECUM WITH POLYPECTOMY;  Surgeon: Charisma Rodriguez MD;  Location: [facility] OR;  Service: Gastroenterology;  Laterality: N/A;  diverticulosis, polyp    COLONOSCOPY W/ POLYPECTOMY  2008    ELBOW OPEN REDUCTION INTERNAL FIXATION Left     with one pin    ENDOSCOPY N/A 7/6/2016    Procedure: ESOPHAGOGASTRODUODENOSCOPY with biopsies;  Surgeon: Charisma Rodriguez MD;  Location: [facility] ENDOSCOPY;  Service:     ENDOSCOPY N/A 1/26/2024    Procedure: ESOPHAGOGASTRODUODENOSCOPY WITH BIOPSY AND  DILATION;  Surgeon: Charisma Rodriguez MD;  [facility];  Service: Gastroenterology;  Laterality: N/A;  15-18mm balloon used. esophageal stricture, duodenitis, abnormal gastric mucosa    INGUINAL HERNIA REPAIR Right     12 y/o    MENISCECTOMY Right      Family History   Problem Relation Age of Onset    Diabetes Mother         Type 2    Stroke Mother         hemorrhagic     Dementia Mother     Heart disease Father         smoker    Alzheimer's disease Father 94    Diabetes Father         Type 2    Stroke Father     Anxiety disorder Father     No Known Problems Sister     No Known Problems Brother     Other Maternal Aunt 50        smoker, unknown    Cirrhosis Brother     Alcohol abuse Brother          age 72 from cirrosis    No Known Problems Brother     Prostate cancer Neg Hx     Colon cancer Neg Hx     Breast cancer Neg Hx      Social History     Socioeconomic History    Marital status:    Tobacco Use    Smoking status: Never    Smokeless tobacco: Never   Vaping Use    Vaping Use: Never used   Substance and Sexual Activity    Alcohol use: Yes     Comment: Wine or Beer 1-2 per week    Drug use: No    Sexual activity: Yes     Partners: Female     Comment: Heterosexual       Objective   Physical Exam  Constitutional:       Appearance: Normal appearance. He is well-developed. He is not toxic-appearing.   Eyes:      General: No scleral icterus.  Pulmonary:      Effort: Pulmonary effort is normal. No respiratory distress.   Abdominal:      Palpations: Abdomen is soft.      Tenderness: There is no abdominal tenderness.      Hernia: Hernia is present in the left inguinal area. There is no hernia in the right inguinal area.      Comments: There is a moderately sized left inguinal hernia that is reducible and contains bowel.   Skin:     General: Skin is warm and dry.   Neurological:      Mental Status: He is alert and oriented to person, place, and time.   Psychiatric:         Behavior: Behavior normal.          Thought Content: Thought content normal.         Judgment: Judgment normal.              Assessment & Plan     1.  Left inguinal hernia: The patient has a left inguinal hernia that is moderate in size and appears to contain bowel.  It is at risk of becoming incarcerated.  It was discussed with him.  Approaches to inguinal hernia repair were also discussed with him.  He will be scheduled for da Suleiman robot-assisted laparoscopic left inguinal hernia repair, possible bilateral inguinal hernia repairs.  The patient understands the indications, alternatives, risks, and benefits of the procedure and wishes to proceed.

## 2024-04-02 ENCOUNTER — PRE-ADMISSION TESTING (OUTPATIENT)
Dept: PREADMISSION TESTING | Facility: HOSPITAL | Age: 69
End: 2024-04-02
Payer: MEDICARE

## 2024-04-02 VITALS
DIASTOLIC BLOOD PRESSURE: 72 MMHG | OXYGEN SATURATION: 96 % | HEIGHT: 66 IN | WEIGHT: 173.2 LBS | BODY MASS INDEX: 27.83 KG/M2 | RESPIRATION RATE: 20 BRPM | SYSTOLIC BLOOD PRESSURE: 121 MMHG | HEART RATE: 54 BPM | TEMPERATURE: 97.3 F

## 2024-04-02 LAB
ANION GAP SERPL CALCULATED.3IONS-SCNC: 8 MMOL/L (ref 5–15)
BUN SERPL-MCNC: 16 MG/DL (ref 8–23)
BUN/CREAT SERPL: 15.4 (ref 7–25)
CALCIUM SPEC-SCNC: 8.8 MG/DL (ref 8.6–10.5)
CHLORIDE SERPL-SCNC: 107 MMOL/L (ref 98–107)
CO2 SERPL-SCNC: 26 MMOL/L (ref 22–29)
CREAT SERPL-MCNC: 1.04 MG/DL (ref 0.76–1.27)
DEPRECATED RDW RBC AUTO: 42.1 FL (ref 37–54)
EGFRCR SERPLBLD CKD-EPI 2021: 77.2 ML/MIN/1.73
ERYTHROCYTE [DISTWIDTH] IN BLOOD BY AUTOMATED COUNT: 12.3 % (ref 12.3–15.4)
GLUCOSE SERPL-MCNC: 131 MG/DL (ref 65–99)
HCT VFR BLD AUTO: 42.3 % (ref 37.5–51)
HGB BLD-MCNC: 14 G/DL (ref 13–17.7)
MCH RBC QN AUTO: 31 PG (ref 26.6–33)
MCHC RBC AUTO-ENTMCNC: 33.1 G/DL (ref 31.5–35.7)
MCV RBC AUTO: 93.6 FL (ref 79–97)
PLATELET # BLD AUTO: 242 10*3/MM3 (ref 140–450)
PMV BLD AUTO: 9.4 FL (ref 6–12)
POTASSIUM SERPL-SCNC: 4.7 MMOL/L (ref 3.5–5.2)
QT INTERVAL: 504 MS
QTC INTERVAL: 469 MS
RBC # BLD AUTO: 4.52 10*6/MM3 (ref 4.14–5.8)
SODIUM SERPL-SCNC: 141 MMOL/L (ref 136–145)
WBC NRBC COR # BLD AUTO: 4.72 10*3/MM3 (ref 3.4–10.8)

## 2024-04-02 PROCEDURE — 85027 COMPLETE CBC AUTOMATED: CPT

## 2024-04-02 PROCEDURE — 80048 BASIC METABOLIC PNL TOTAL CA: CPT

## 2024-04-02 PROCEDURE — 93005 ELECTROCARDIOGRAM TRACING: CPT

## 2024-04-02 PROCEDURE — 36415 COLL VENOUS BLD VENIPUNCTURE: CPT

## 2024-04-02 RX ORDER — ALBUTEROL SULFATE 90 UG/1
2 AEROSOL, METERED RESPIRATORY (INHALATION) EVERY 4 HOURS PRN
COMMUNITY

## 2024-04-02 NOTE — DISCHARGE INSTRUCTIONS
Take the following medications the morning of surgery:    TRELEGY, PANTOPRAZOLE      If you are on prescription narcotic pain medication to control your pain you may also take that medication the morning of surgery.    General Instructions:  Do not eat solid food after midnight the night before surgery.  You may drink clear liquids day of surgery but must stop at least one hour before your hospital arrival time.  It is beneficial for you to have a clear drink that contains carbohydrates the day of surgery.  We suggest a 12 to 20 ounce bottle of Gatorade or Powerade for non-diabetic patients or a 12 to 20 ounce bottle of G2 or Powerade Zero for diabetic patients. (Pediatric patients, are not advised to drink a 12 to 20 ounce carbohydrate drink)    Clear liquids are liquids you can see through.  Nothing red in color.     Plain water                               Sports drinks  Sodas                                   Gelatin (Jell-O)  Fruit juices without pulp such as white grape juice and apple juice  Popsicles that contain no fruit or yogurt  Tea or coffee (no cream or milk added)  Gatorade / Powerade  G2 / Powerade Zero    Infants may have breast milk up to four hours before surgery.  Infants drinking formula may drink formula up to six hours before surgery.   Patients who avoid smoking, chewing tobacco and alcohol for 4 weeks prior to surgery have a reduced risk of post-operative complications.  Quit smoking as many days before surgery as you can.  Do not smoke, use chewing tobacco or drink alcohol the day of surgery.   If applicable bring your C-PAP/ BI-PAP machine in with you to preop day of surgery.  Bring any papers given to you in the doctor’s office.  Wear clean comfortable clothes.  Do not wear contact lenses, false eyelashes or make-up.  Bring a case for your glasses.   Bring crutches or walker if applicable.  Remove all piercings.  Leave jewelry and any other valuables at home.  Hair extensions with metal  clips must be removed prior to surgery.  The Pre-Admission Testing nurse will instruct you to bring medications if unable to obtain an accurate list in Pre-Admission Testing.        Preventing a Surgical Site Infection:  For 2 to 3 days before surgery, avoid shaving with a razor because the razor can irritate skin and make it easier to develop an infection.    Any areas of open skin can increase the risk of a post-operative wound infection by allowing bacteria to enter and travel throughout the body.  Notify your surgeon if you have any skin wounds / rashes even if it is not near the expected surgical site.  The area will need assessed to determine if surgery should be delayed until it is healed.  The night prior to surgery shower using a fresh bar of anti-bacterial soap (such as Dial) and clean washcloth.  Sleep in a clean bed with clean clothing.  Do not allow pets to sleep with you.  Shower on the morning of surgery using a fresh bar of anti-bacterial soap (such as Dial) and clean washcloth.  Dry with a clean towel and dress in clean clothing.  Ask your surgeon if you will be receiving antibiotics prior to surgery.  Make sure you, your family, and all healthcare providers clean their hands with soap and water or an alcohol based hand  before caring for you or your wound.    Day of surgery:  Your arrival time is approximately two hours before your scheduled surgery time.  Upon arrival, a Pre-op nurse and Anesthesiologist will review your health history, obtain vital signs, and answer questions you may have.  The only belongings needed at this time will be a list of your home medications and if applicable your C-PAP/BI-PAP machine.  A Pre-op nurse will start an IV and you may receive medication in preparation for surgery, including something to help you relax.     Please be aware that surgery does come with discomfort.  We want to make every effort to control your discomfort so please discuss any  uncontrolled symptoms with your nurse.   Your doctor will most likely have prescribed pain medications.      If you are going home after surgery you will receive individualized written care instructions before being discharged.  A responsible adult must drive you to and from the hospital on the day of your surgery and ideally stay with you through the night.   .  Discharge prescriptions can be filled by the hospital pharmacy during regular pharmacy hours.  If you are having surgery late in the day/evening your prescription may be e-prescribed to your pharmacy.  Please verify your pharmacy hours or chose a 24 hour pharmacy to avoid not having access to your prescription because your pharmacy has closed for the day.    If you are staying overnight following surgery, you will be transported to your hospital room following the recovery period.  [facility] has all private rooms.    If you have any questions please call Pre-Admission Testing at [phone].  Deductibles and co-payments are collected on the day of service. Please be prepared to pay the required co-pay, deductible or deposit on the day of service as defined by your plan.    Call your surgeon immediately if you experience any of the following symptoms:  Sore Throat  Shortness of Breath or difficulty breathing  Cough  Chills  Body soreness or muscle pain  Headache  Fever  New loss of taste or smell  Do not arrive for your surgery ill.  Your procedure will need to be rescheduled to another time.  You will need to call your physician before the day of surgery to avoid any unnecessary exposure to hospital staff as well as other patients.      CHLORHEXIDINE CLOTH INSTRUCTIONS  The morning of surgery follow these instructions using the Chlorhexidine cloths you've been given.  These steps reduce bacteria on the body.  Do not use the cloths near your eyes, ears mouth, genitalia or on open wounds.  Throw the cloths away after use but do not try to  flush them down a toilet.      Open and remove one cloth at a time from the package.    Leave the cloth unfolded and begin the bathing.  Massage the skin with the cloths using gentle pressure to remove bacteria.  Do not scrub harshly.   Follow the steps below with one 2% CHG cloth per area (6 total cloths).  One cloth for neck, shoulders and chest.  One cloth for both arms, hands, fingers and underarms (do underarms last).  One cloth for the abdomen followed by groin.  One cloth for right leg and foot including between the toes.  One cloth for left leg and foot including between the toes.  The last cloth is to be used for the back of the neck, back and buttocks.    Allow the CHG to air dry 3 minutes on the skin which will give it time to work and decrease the chance of irritation.  The skin may feel sticky until it is dry.  Do not rinse with water or any other liquid or you will lose the beneficial effects of the CHG.  If mild skin irritation occurs, do rinse the skin to remove the CHG.  Report this to the nurse at time of admission.  Do not apply lotions, creams, ointments, deodorants or perfumes after using the clothes. Dress in clean clothes before coming to the hospital.

## 2024-04-04 ENCOUNTER — TELEPHONE (OUTPATIENT)
Dept: SURGERY | Facility: CLINIC | Age: 69
End: 2024-04-04
Payer: MEDICARE

## 2024-04-04 NOTE — TELEPHONE ENCOUNTER
Patient called back. Asking for a referral be placed to see Dr. Efrem Quinonez. Patient is scheduled for Surgery on Monday and aware if he can not be seen tomorrow his surgery will have to be postponed.

## 2024-04-04 NOTE — TELEPHONE ENCOUNTER
Left message for patient to call back (should ask for Casandra) and inform her if he has a cardiologist. He will need to be cleared before 4/8/24, or surgery will need to be postponed until he has been cleared.

## 2024-04-04 NOTE — TELEPHONE ENCOUNTER
ASHOK just called on this patient who is scheduled for robotic lap left ing hernia prepare. EKG came back abnormal and will need to be cleared. I will reach out to the patient and confirms he sees a cardiologist. If not he will need to be rescheduled once he is cleared.

## 2024-04-05 ENCOUNTER — TELEPHONE (OUTPATIENT)
Dept: SURGERY | Facility: CLINIC | Age: 69
End: 2024-04-05
Payer: MEDICARE

## 2024-04-05 NOTE — TELEPHONE ENCOUNTER
I left the patient a voicemail that since he has a cardiology appointment on Monday 4/8 and that is when his surgery is scheduled, so it will need to be postponed until he has been cleared. Patient to call back after seeing cardiology to reschedule. Orders placed in depot.

## 2024-04-26 ENCOUNTER — HOSPITAL ENCOUNTER (OUTPATIENT)
Facility: HOSPITAL | Age: 69
Setting detail: HOSPITAL OUTPATIENT SURGERY
Discharge: HOME | End: 2024-04-26
Attending: SURGERY | Admitting: SURGERY
Payer: MEDICARE

## 2024-04-26 ENCOUNTER — ANESTHESIA EVENT (OUTPATIENT)
Dept: PREOP | Facility: HOSPITAL | Age: 69
End: 2024-04-26

## 2024-04-26 ENCOUNTER — ANESTHESIA (OUTPATIENT)
Dept: PREOP | Facility: HOSPITAL | Age: 69
End: 2024-04-26

## 2024-04-26 VITALS
TEMPERATURE: 97.9 F | HEART RATE: 51 BPM | RESPIRATION RATE: 16 BRPM | DIASTOLIC BLOOD PRESSURE: 79 MMHG | OXYGEN SATURATION: 99 % | SYSTOLIC BLOOD PRESSURE: 136 MMHG

## 2024-04-26 DIAGNOSIS — K40.90 LEFT INGUINAL HERNIA: ICD-10-CM

## 2024-04-26 LAB
QT INTERVAL: 476 MS
QTC INTERVAL: 439 MS

## 2024-04-26 PROCEDURE — G0463 HOSPITAL OUTPT CLINIC VISIT: HCPCS | Performed by: SURGERY

## 2024-04-26 PROCEDURE — 93005 ELECTROCARDIOGRAM TRACING: CPT | Performed by: ANESTHESIOLOGY

## 2024-04-26 PROCEDURE — 93010 ELECTROCARDIOGRAM REPORT: CPT | Performed by: INTERNAL MEDICINE

## 2024-04-26 NOTE — ANESTHESIA PREPROCEDURE EVALUATION
Anesthesia Evaluation     no history of anesthetic complications:                Airway   Dental      Pulmonary    (+) asthma (intermittent),  Cardiovascular       ROS comment: Ant/sep MI, diffuse ischemia    Neuro/Psych  GI/Hepatic/Renal/Endo    (+) GERD    Musculoskeletal     Abdominal    Substance History      OB/GYN          Other                      Anesthesia Plan    CODE STATUS:

## 2024-04-29 ENCOUNTER — TELEPHONE (OUTPATIENT)
Dept: SURGERY | Facility: CLINIC | Age: 69
End: 2024-04-29
Payer: MEDICARE

## 2024-04-29 NOTE — TELEPHONE ENCOUNTER
ADDEND  4/29/24 @ 1:56 PM    I spoke with Mr. Rodriguez and he is scheduled tomorrow at [facility] for CT heart angiogram. Patient stated he also has a follow-up appointment with the cardiologist. I provided the patient with our fax number to have the cardiologist send over cardiac clearance or denial after appointment. Patient voiced understanding.      ----- Message from Casandra LONG sent at 4/26/2024 11:12 AM EDT -----  Regarding: RE: Surgery canceled  I appreciate that and we saw the cardiac clearance but anesthesia did not except that because he has not seen his cardiologist for a while and there were EKG changes on preadmission testing that the cardiologist did not mention.  Anesthesia asked him to go see his cardiologist for an official visit.  It was a surprise to me that cardiac clearance was there but they did not accept it.  Thanks  ----- Message -----  From: Casandra Zavala MA  Sent: 4/26/2024   8:09 AM EDT  To: Smith Lamb Jr., MD  Subject: RE: Surgery canceled                             Just keeping you in the know. I did previously fax a cardiac clearance request and he was cleared by cardiologist. This can be found in the media tab. Please place new orders for surgery so I can see about getting him rescheduled in the event he does get in with his cardiologist.  ----- Message -----  From: Smith Lamb Jr., MD  Sent: 4/26/2024   7:44 AM EDT  To: [facility]  Subject: Surgery canceled                                 The surgery was canceled this morning because they say he needs cardiac clearance.  He is going to try to see his cardiologist today or early next week and then will need to be scheduled next week if possible because he has a trip to [place] in late May.  Thanks

## 2024-05-02 DIAGNOSIS — K40.90 LEFT INGUINAL HERNIA: Primary | ICD-10-CM

## 2024-05-03 ENCOUNTER — ANESTHESIA EVENT (OUTPATIENT)
Dept: PERIOP | Facility: HOSPITAL | Age: 69
End: 2024-05-03
Payer: MEDICARE

## 2024-05-03 ENCOUNTER — ANESTHESIA (OUTPATIENT)
Dept: PERIOP | Facility: HOSPITAL | Age: 69
End: 2024-05-03
Payer: MEDICARE

## 2024-05-03 ENCOUNTER — HOSPITAL ENCOUNTER (OUTPATIENT)
Facility: HOSPITAL | Age: 69
Setting detail: HOSPITAL OUTPATIENT SURGERY
Discharge: HOME | End: 2024-05-03
Attending: SURGERY | Admitting: SURGERY
Payer: MEDICARE

## 2024-05-03 VITALS
OXYGEN SATURATION: 100 % | TEMPERATURE: 97.8 F | SYSTOLIC BLOOD PRESSURE: 151 MMHG | HEART RATE: 50 BPM | DIASTOLIC BLOOD PRESSURE: 77 MMHG | RESPIRATION RATE: 16 BRPM

## 2024-05-03 DIAGNOSIS — K40.90 LEFT INGUINAL HERNIA: ICD-10-CM

## 2024-05-03 PROCEDURE — C1781 MESH (IMPLANTABLE): HCPCS | Performed by: SURGERY

## 2024-05-03 PROCEDURE — 25010000002 CEFAZOLIN PER 500 MG: Performed by: SURGERY

## 2024-05-03 PROCEDURE — 25010000002 ONDANSETRON PER 1 MG

## 2024-05-03 PROCEDURE — 25010000002 DEXAMETHASONE SODIUM PHOSPHATE 20 MG/5ML SOLUTION

## 2024-05-03 PROCEDURE — 49650 LAP ING HERNIA REPAIR INIT: CPT | Performed by: SURGERY

## 2024-05-03 PROCEDURE — 25010000002 SUGAMMADEX 200 MG/2ML SOLUTION

## 2024-05-03 PROCEDURE — S2900 ROBOTIC SURGICAL SYSTEM: HCPCS | Performed by: SURGERY

## 2024-05-03 PROCEDURE — 25010000002 MAGNESIUM SULFATE PER 500 MG OF MAGNESIUM

## 2024-05-03 PROCEDURE — 25010000002 FENTANYL CITRATE (PF) 50 MCG/ML SOLUTION

## 2024-05-03 PROCEDURE — 25010000002 KETOROLAC TROMETHAMINE PER 15 MG

## 2024-05-03 PROCEDURE — S0260 H&P FOR SURGERY: HCPCS | Performed by: SURGERY

## 2024-05-03 PROCEDURE — 25810000003 LACTATED RINGERS PER 1000 ML

## 2024-05-03 PROCEDURE — 25810000003 LACTATED RINGERS PER 1000 ML: Performed by: STUDENT IN AN ORGANIZED HEALTH CARE EDUCATION/TRAINING PROGRAM

## 2024-05-03 PROCEDURE — 25010000002 PROPOFOL 10 MG/ML EMULSION

## 2024-05-03 PROCEDURE — 25010000002 GLYCOPYRROLATE 1 MG/5ML SOLUTION

## 2024-05-03 DEVICE — DEV WND/CLS CONTRL TISS STRATAFIX SPIRAL MNCRYL SH 2/0 20CM: Type: IMPLANTABLE DEVICE | Site: ABDOMEN | Status: FUNCTIONAL

## 2024-05-03 DEVICE — IMPLANTABLE DEVICE: Type: IMPLANTABLE DEVICE | Site: ABDOMEN | Status: FUNCTIONAL

## 2024-05-03 RX ORDER — ACETAMINOPHEN 500 MG
1000 TABLET ORAL ONCE
Status: COMPLETED | OUTPATIENT
Start: 2024-05-03 | End: 2024-05-03

## 2024-05-03 RX ORDER — FENTANYL CITRATE 50 UG/ML
50 INJECTION, SOLUTION INTRAMUSCULAR; INTRAVENOUS
Status: DISCONTINUED | OUTPATIENT
Start: 2024-05-03 | End: 2024-05-03 | Stop reason: HOSPADM

## 2024-05-03 RX ORDER — HYDROCODONE BITARTRATE AND ACETAMINOPHEN 5; 325 MG/1; MG/1
1 TABLET ORAL ONCE AS NEEDED
Status: DISCONTINUED | OUTPATIENT
Start: 2024-05-03 | End: 2024-05-03 | Stop reason: HOSPADM

## 2024-05-03 RX ORDER — IPRATROPIUM BROMIDE AND ALBUTEROL SULFATE 2.5; .5 MG/3ML; MG/3ML
3 SOLUTION RESPIRATORY (INHALATION) ONCE AS NEEDED
Status: DISCONTINUED | OUTPATIENT
Start: 2024-05-03 | End: 2024-05-03 | Stop reason: HOSPADM

## 2024-05-03 RX ORDER — FENTANYL CITRATE 50 UG/ML
INJECTION, SOLUTION INTRAMUSCULAR; INTRAVENOUS AS NEEDED
Status: DISCONTINUED | OUTPATIENT
Start: 2024-05-03 | End: 2024-05-03 | Stop reason: SURG

## 2024-05-03 RX ORDER — HYDROMORPHONE HYDROCHLORIDE 1 MG/ML
0.5 INJECTION, SOLUTION INTRAMUSCULAR; INTRAVENOUS; SUBCUTANEOUS
Status: DISCONTINUED | OUTPATIENT
Start: 2024-05-03 | End: 2024-05-03 | Stop reason: HOSPADM

## 2024-05-03 RX ORDER — OXYCODONE AND ACETAMINOPHEN 7.5; 325 MG/1; MG/1
1 TABLET ORAL EVERY 4 HOURS PRN
Status: DISCONTINUED | OUTPATIENT
Start: 2024-05-03 | End: 2024-05-03 | Stop reason: HOSPADM

## 2024-05-03 RX ORDER — SODIUM CHLORIDE, SODIUM LACTATE, POTASSIUM CHLORIDE, CALCIUM CHLORIDE 600; 310; 30; 20 MG/100ML; MG/100ML; MG/100ML; MG/100ML
9 INJECTION, SOLUTION INTRAVENOUS CONTINUOUS
Status: DISCONTINUED | OUTPATIENT
Start: 2024-05-03 | End: 2024-05-03 | Stop reason: HOSPADM

## 2024-05-03 RX ORDER — DEXAMETHASONE SODIUM PHOSPHATE 4 MG/ML
INJECTION, SOLUTION INTRA-ARTICULAR; INTRALESIONAL; INTRAMUSCULAR; INTRAVENOUS; SOFT TISSUE AS NEEDED
Status: DISCONTINUED | OUTPATIENT
Start: 2024-05-03 | End: 2024-05-03 | Stop reason: SURG

## 2024-05-03 RX ORDER — MAGNESIUM SULFATE HEPTAHYDRATE 500 MG/ML
INJECTION, SOLUTION INTRAMUSCULAR; INTRAVENOUS AS NEEDED
Status: DISCONTINUED | OUTPATIENT
Start: 2024-05-03 | End: 2024-05-03 | Stop reason: SURG

## 2024-05-03 RX ORDER — EPHEDRINE SULFATE 50 MG/ML
INJECTION, SOLUTION INTRAVENOUS AS NEEDED
Status: DISCONTINUED | OUTPATIENT
Start: 2024-05-03 | End: 2024-05-03 | Stop reason: SURG

## 2024-05-03 RX ORDER — LIDOCAINE HYDROCHLORIDE 10 MG/ML
0.5 INJECTION, SOLUTION INFILTRATION; PERINEURAL ONCE AS NEEDED
Status: DISCONTINUED | OUTPATIENT
Start: 2024-05-03 | End: 2024-05-03 | Stop reason: HOSPADM

## 2024-05-03 RX ORDER — ONDANSETRON 2 MG/ML
INJECTION INTRAMUSCULAR; INTRAVENOUS AS NEEDED
Status: DISCONTINUED | OUTPATIENT
Start: 2024-05-03 | End: 2024-05-03 | Stop reason: SURG

## 2024-05-03 RX ORDER — PROPOFOL 10 MG/ML
VIAL (ML) INTRAVENOUS AS NEEDED
Status: DISCONTINUED | OUTPATIENT
Start: 2024-05-03 | End: 2024-05-03 | Stop reason: SURG

## 2024-05-03 RX ORDER — FLUMAZENIL 0.1 MG/ML
0.2 INJECTION INTRAVENOUS AS NEEDED
Status: DISCONTINUED | OUTPATIENT
Start: 2024-05-03 | End: 2024-05-03 | Stop reason: HOSPADM

## 2024-05-03 RX ORDER — LABETALOL HYDROCHLORIDE 5 MG/ML
5 INJECTION, SOLUTION INTRAVENOUS
Status: DISCONTINUED | OUTPATIENT
Start: 2024-05-03 | End: 2024-05-03 | Stop reason: HOSPADM

## 2024-05-03 RX ORDER — BUPIVACAINE HYDROCHLORIDE AND EPINEPHRINE 5; 5 MG/ML; UG/ML
INJECTION, SOLUTION EPIDURAL; INTRACAUDAL; PERINEURAL AS NEEDED
Status: DISCONTINUED | OUTPATIENT
Start: 2024-05-03 | End: 2024-05-03 | Stop reason: HOSPADM

## 2024-05-03 RX ORDER — HYDRALAZINE HYDROCHLORIDE 20 MG/ML
5 INJECTION INTRAMUSCULAR; INTRAVENOUS
Status: DISCONTINUED | OUTPATIENT
Start: 2024-05-03 | End: 2024-05-03 | Stop reason: HOSPADM

## 2024-05-03 RX ORDER — ONDANSETRON 2 MG/ML
4 INJECTION INTRAMUSCULAR; INTRAVENOUS ONCE AS NEEDED
Status: DISCONTINUED | OUTPATIENT
Start: 2024-05-03 | End: 2024-05-03 | Stop reason: HOSPADM

## 2024-05-03 RX ORDER — ROCURONIUM BROMIDE 10 MG/ML
INJECTION, SOLUTION INTRAVENOUS AS NEEDED
Status: DISCONTINUED | OUTPATIENT
Start: 2024-05-03 | End: 2024-05-03 | Stop reason: SURG

## 2024-05-03 RX ORDER — SODIUM CHLORIDE 0.9 % (FLUSH) 0.9 %
3-10 SYRINGE (ML) INJECTION AS NEEDED
Status: DISCONTINUED | OUTPATIENT
Start: 2024-05-03 | End: 2024-05-03 | Stop reason: HOSPADM

## 2024-05-03 RX ORDER — GLYCOPYRROLATE 0.2 MG/ML
INJECTION INTRAMUSCULAR; INTRAVENOUS AS NEEDED
Status: DISCONTINUED | OUTPATIENT
Start: 2024-05-03 | End: 2024-05-03 | Stop reason: SURG

## 2024-05-03 RX ORDER — SODIUM CHLORIDE 0.9 % (FLUSH) 0.9 %
3 SYRINGE (ML) INJECTION EVERY 12 HOURS SCHEDULED
Status: DISCONTINUED | OUTPATIENT
Start: 2024-05-03 | End: 2024-05-03 | Stop reason: HOSPADM

## 2024-05-03 RX ORDER — NALOXONE HCL 0.4 MG/ML
0.2 VIAL (ML) INJECTION AS NEEDED
Status: DISCONTINUED | OUTPATIENT
Start: 2024-05-03 | End: 2024-05-03 | Stop reason: HOSPADM

## 2024-05-03 RX ORDER — EPHEDRINE SULFATE 50 MG/ML
5 INJECTION, SOLUTION INTRAVENOUS ONCE AS NEEDED
Status: DISCONTINUED | OUTPATIENT
Start: 2024-05-03 | End: 2024-05-03 | Stop reason: HOSPADM

## 2024-05-03 RX ORDER — SODIUM CHLORIDE, SODIUM LACTATE, POTASSIUM CHLORIDE, CALCIUM CHLORIDE 600; 310; 30; 20 MG/100ML; MG/100ML; MG/100ML; MG/100ML
INJECTION, SOLUTION INTRAVENOUS CONTINUOUS PRN
Status: DISCONTINUED | OUTPATIENT
Start: 2024-05-03 | End: 2024-05-03 | Stop reason: SURG

## 2024-05-03 RX ORDER — LIDOCAINE HYDROCHLORIDE 20 MG/ML
INJECTION, SOLUTION INFILTRATION; PERINEURAL AS NEEDED
Status: DISCONTINUED | OUTPATIENT
Start: 2024-05-03 | End: 2024-05-03 | Stop reason: SURG

## 2024-05-03 RX ORDER — OXYCODONE HYDROCHLORIDE AND ACETAMINOPHEN 5; 325 MG/1; MG/1
1 TABLET ORAL EVERY 6 HOURS PRN
Qty: 8 TABLET | Refills: 0 | Status: SHIPPED | OUTPATIENT
Start: 2024-05-03

## 2024-05-03 RX ORDER — KETOROLAC TROMETHAMINE 30 MG/ML
INJECTION, SOLUTION INTRAMUSCULAR; INTRAVENOUS AS NEEDED
Status: DISCONTINUED | OUTPATIENT
Start: 2024-05-03 | End: 2024-05-03 | Stop reason: SURG

## 2024-05-03 RX ADMIN — EPHEDRINE SULFATE 5 MG: 50 INJECTION INTRAVENOUS at 11:00

## 2024-05-03 RX ADMIN — SODIUM CHLORIDE, POTASSIUM CHLORIDE, SODIUM LACTATE AND CALCIUM CHLORIDE 9 ML/HR: 600; 310; 30; 20 INJECTION, SOLUTION INTRAVENOUS at 09:21

## 2024-05-03 RX ADMIN — FENTANYL CITRATE 50 MCG: 50 INJECTION, SOLUTION INTRAMUSCULAR; INTRAVENOUS at 11:35

## 2024-05-03 RX ADMIN — MAGNESIUM SULFATE HEPTAHYDRATE 1 G: 500 INJECTION, SOLUTION INTRAMUSCULAR; INTRAVENOUS at 10:40

## 2024-05-03 RX ADMIN — KETOROLAC TROMETHAMINE 15 MG: 30 INJECTION, SOLUTION INTRAMUSCULAR at 11:25

## 2024-05-03 RX ADMIN — SODIUM CHLORIDE 2000 MG: 900 INJECTION INTRAVENOUS at 09:46

## 2024-05-03 RX ADMIN — OXYCODONE AND ACETAMINOPHEN 1 TABLET: 7.5; 325 TABLET ORAL at 12:17

## 2024-05-03 RX ADMIN — ACETAMINOPHEN 1000 MG: 500 TABLET ORAL at 09:40

## 2024-05-03 RX ADMIN — ROCURONIUM BROMIDE 10 MG: 10 INJECTION, SOLUTION INTRAVENOUS at 11:19

## 2024-05-03 RX ADMIN — DEXAMETHASONE SODIUM PHOSPHATE 8 MG: 4 INJECTION, SOLUTION INTRAMUSCULAR; INTRAVENOUS at 10:17

## 2024-05-03 RX ADMIN — SODIUM CHLORIDE, POTASSIUM CHLORIDE, SODIUM LACTATE AND CALCIUM CHLORIDE: 600; 310; 30; 20 INJECTION, SOLUTION INTRAVENOUS at 10:02

## 2024-05-03 RX ADMIN — PROPOFOL 170 MG: 10 INJECTION, EMULSION INTRAVENOUS at 10:12

## 2024-05-03 RX ADMIN — ONDANSETRON 4 MG: 2 INJECTION INTRAMUSCULAR; INTRAVENOUS at 11:21

## 2024-05-03 RX ADMIN — LIDOCAINE HYDROCHLORIDE 100 MG: 20 INJECTION, SOLUTION INFILTRATION; PERINEURAL at 10:12

## 2024-05-03 RX ADMIN — ROCURONIUM BROMIDE 50 MG: 10 INJECTION, SOLUTION INTRAVENOUS at 10:13

## 2024-05-03 RX ADMIN — GLYCOPYRROLATE 0.2 MG: 0.2 INJECTION, SOLUTION INTRAMUSCULAR; INTRAVENOUS at 10:10

## 2024-05-03 RX ADMIN — SUGAMMADEX 200 MG: 100 INJECTION, SOLUTION INTRAVENOUS at 11:29

## 2024-05-03 RX ADMIN — PROPOFOL 25 MCG/KG/MIN: 10 INJECTION, EMULSION INTRAVENOUS at 10:17

## 2024-05-03 RX ADMIN — FENTANYL CITRATE 50 MCG: 50 INJECTION, SOLUTION INTRAMUSCULAR; INTRAVENOUS at 10:12

## 2024-05-03 NOTE — ANESTHESIA POSTPROCEDURE EVALUATION
Patient: Vito Rodriguez    Procedure Summary       Date: 05/03/24 Room / Location: [facility] OR [address] [place] [address] MAIN OR    Anesthesia Start: 1002 Anesthesia Stop: 1148    Procedure: Davinci assisted laparoscopic left inguinal hernia repair (Left: Abdomen) Diagnosis:       Left inguinal hernia      (Left inguinal hernia [K40.90])    Surgeons: Smith Lamb Jr., MD Provider: Uri Arana MD    Anesthesia Type: general ASA Status: 2            Anesthesia Type: general    Vitals  Vitals Value Taken Time   /82 05/03/24 1215   Temp 36.6 °C (97.8 °F) 05/03/24 1220   Pulse 46 05/03/24 1226   Resp 16 05/03/24 1215   SpO2 100 % 05/03/24 1226   Vitals shown include unfiled device data.        Post Anesthesia Care and Evaluation    Level of consciousness: awake and alert  Pain management: adequate    Airway patency: patent  Anesthetic complications: No anesthetic complications  PONV Status: controlled  Cardiovascular status: blood pressure returned to baseline and acceptable  Respiratory status: acceptable  Hydration status: acceptable

## 2024-05-03 NOTE — ANESTHESIA PROCEDURE NOTES
Airway  Urgency: elective    Date/Time: 5/3/2024 10:16 AM  Airway not difficult    General Information and Staff    Patient location during procedure: OR  Anesthesiologist: Uri Arana MD  CRNA/CAA: Josee Brown CRNA    Indications and Patient Condition  Indications for airway management: airway protection    Preoxygenated: yes  MILS not maintained throughout  Mask difficulty assessment: 2 - vent by mask + OA or adjuvant +/- NMBA    Final Airway Details  Final airway type: endotracheal airway      Successful airway: ETT  Cuffed: yes   Successful intubation technique: direct laryngoscopy  Facilitating devices/methods: intubating stylet and cricoid pressure  Endotracheal tube insertion site: oral  Blade: Cricket  Blade size: 4  ETT size (mm): 7.5  Cormack-Lehane Classification: grade I - full view of glottis  Placement verified by: chest auscultation and capnometry   Cuff volume (mL): 8  Measured from: lips  ETT/EBT  to lips (cm): 23  Number of attempts at approach: 1  Assessment: lips, teeth, and gum same as pre-op and atraumatic intubation    Additional Comments  Airway exam prior to DL, teeth/lips inspected. Preoxygenated with 100% O2; sniffing position, easy mask ventilation. Eyes taped. Atraumatic intubation. Lips and teeth intact, no damage. ETT connected to vent. Confirmed EBBS, +EtCO2.

## 2024-05-03 NOTE — OP NOTE
Surgeon: Smith Lamb Jr., M.D.    Assistant: Merary Sterling CSA    An assistant was necessary and provided valuable retraction and exposure, as well as camera holding, instrument exchanges, suture and needle retrieval, and wound closure.  An assistant was crucial for the success of the case especially while I was at the console.    Pre-Operative Diagnosis:     Left inguinal hernia [K40.90]    Post-Operative Diagnosis:    Left inguinal hernia    Procedure Performed:     Da Suleiman robot assisted laparoscopic left inguinal hernia repair    Indications:     The patient is a pleasant 68-year-old male who has a several year history of a bulge in the left groin that has gotten larger and increasingly symptomatic.  He was diagnosed with a left inguinal hernia.  He presents for da Suleiman robot assisted laparoscopic left inguinal hernia repair, possible bilateral inguinal hernia repairs.  The patient understands the indications, alternatives, risks, and benefits of the procedure and wishes to proceed.     Procedure:     The patient was identified and taken to the operating room where he was placed in the supine position on the operating table.  Monitors were placed and he underwent general endotracheal anesthesia and was appropriately monitored throughout the case by the anesthesia personnel.  The abdomen was prepped and draped in the standard surgical fashion.  Each incision was infiltrated with 0.5% Marcaine with epinephrine prior to making the incision.  A supraumbilical incision was made and carried through the skin into the subcutaneous tissue.  The abdominal wall was elevated with skin hooks and a Veress needle was inserted through the incision into the abdomen without difficulty.  The abdomen was then insufflated with low pressures encountered initially.  Once fully insufflated, the Veress needle was removed and an 8 mm da Suleiman port was placed in the same incision, again with traction applied to the abdominal wall  using skin hooks.  The laparoscope was inserted and the area of Veress needle and port insertion was inspected, no injury had occurred.  An 8 mm right mid abdominal port and left mid abdominal port were then placed by making a skin incision carried through the skin into the subcutaneous tissue and inserting the port through the incision into the abdomen with direct visualization intra-abdominal using laparoscope.  Attention was then turned to the pelvis.   There was a large left direct hernia but no right inguinal hernia.  A left side extra-large Bard 3D max mesh was placed in the abdomen as were the 2 sutures.  The robot was then docked.  Attention was turned to the left lower abdomen.  The peritoneum was then incised from the anterior superior iliac spine all the way to the midline about 6 cm above the hernia defect.  The peritoneum was then reflected off the abdominal wall, first a laterally and then medially.  Along the medial aspect dissection was taken below Ammon's ligament to fully expose Ammon's ligament.  The hernia sac was then completely reduced.  A Bard 3D max mesh was then placed in the pocket and secured at Ammon's ligament with 2-0 Vicryl suture and then to the left and right of the epigastric artery with 2-0 Vicryl suture.  This provided excellent coverage of the hernia.  The peritoneum was then reapproximated with 2-0 barbed suture.  Hemostasis was noted be adequate.  The needles were removed from the abdomen.  The robot was undocked.  The ports were removed.  All skin incisions were closed with a 4-0 Monocryl in a subcuticular fashion followed by Mastisol and Steri-Strips.  The sponge, needle, and instrument counts were correct at the end of the case.  The patient tolerated the procedure well and was transferred to the recovery room in stable condition.     Estimated Blood Loss:      minimal    Specimens:     None    Smith Lamb Jr., M.D.

## 2024-05-03 NOTE — H&P
Raymond Rodriguez is a 68 y.o. male who presents for a left inguinal hernia.     History of present illness  The patient has a several year history of a bulge in the left groin that was small and asymptomatic.  Over the past several months it has gotten larger and is frequently noticeable, especially with activity.  He has some discomfort with a sensation of pressure when the bulge is most prominent with activity.  He has not had any change in his bowel or bladder function.  He has had a previous right inguinal hernia repair and has no problems in his right groin.     Review of Systems   Constitutional:  Negative for fatigue and fever.   Respiratory:  Negative for chest tightness and shortness of breath.    Cardiovascular:  Negative for chest pain and palpitations.   Gastrointestinal:  Positive for abdominal pain. Negative for blood in stool, constipation, diarrhea, nausea and vomiting.      Medical History        Past Medical History:   Diagnosis Date    Asthma      Colon polyp      Diverticulitis of colon 1/25/24     Recent colonocopy by Dr. Rodriguez    GERD (gastroesophageal reflux disease)      Hx of colonic polyp           Surgical History         Past Surgical History:   Procedure Laterality Date    COLONOSCOPY N/A 7/6/2016     Procedure: COLONOSCOPY to cecum;  Surgeon: Charisma Rodriguez MD;  Location: [facility] ENDOSCOPY;  Service:     COLONOSCOPY N/A 1/26/2024     Procedure: COLONOSCOPY TO CECUM WITH POLYPECTOMY;  Surgeon: Charisma Rodriguez MD;  Location: [facility] OR;  Service: Gastroenterology;  Laterality: N/A;  diverticulosis, polyp    COLONOSCOPY W/ POLYPECTOMY   2008    ELBOW OPEN REDUCTION INTERNAL FIXATION Left       with one pin    ENDOSCOPY N/A 7/6/2016     Procedure: ESOPHAGOGASTRODUODENOSCOPY with biopsies;  Surgeon: Charisma Rodriguez MD;  Location: [facility] ENDOSCOPY;  Service:     ENDOSCOPY N/A 1/26/2024     Procedure: ESOPHAGOGASTRODUODENOSCOPY WITH BIOPSY AND DILATION;  Surgeon: Jennifer  Charisma LAYNE MD;  Location[facility];  Service: Gastroenterology;  Laterality: N/A;  15-18mm balloon used. esophageal stricture, duodenitis, abnormal gastric mucosa    INGUINAL HERNIA REPAIR Right       10 y/o    MENISCECTOMY Right                 Family History   Problem Relation Age of Onset    Diabetes Mother           Type 2    Stroke Mother           hemorrhagic     Dementia Mother      Heart disease Father           smoker    Alzheimer's disease Father 94    Diabetes Father           Type 2    Stroke Father      Anxiety disorder Father      No Known Problems Sister      No Known Problems Brother      Other Maternal Aunt 50         smoker, unknown    Cirrhosis Brother      Alcohol abuse Brother            age 72 from cirrosis    No Known Problems Brother      Prostate cancer Neg Hx      Colon cancer Neg Hx      Breast cancer Neg Hx        Social History   Social History            Socioeconomic History    Marital status:    Tobacco Use    Smoking status: Never    Smokeless tobacco: Never   Vaping Use    Vaping Use: Never used   Substance and Sexual Activity    Alcohol use: Yes       Comment: Wine or Beer 1-2 per week    Drug use: No    Sexual activity: Yes       Partners: Female       Comment: Heterosexual                  Objective  Physical Exam  Constitutional:       Appearance: Normal appearance. He is well-developed. He is not toxic-appearing.   Eyes:      General: No scleral icterus.  Pulmonary:      Effort: Pulmonary effort is normal. No respiratory distress.   Abdominal:      Palpations: Abdomen is soft.      Tenderness: There is no abdominal tenderness.      Hernia: Hernia is present in the left inguinal area. There is no hernia in the right inguinal area.      Comments: There is a moderately sized left inguinal hernia that is reducible and contains bowel.   Skin:     General: Skin is warm and dry.   Neurological:      Mental Status: He is alert and oriented to person, place, and time.    Psychiatric:         Behavior: Behavior normal.         Thought Content: Thought content normal.         Judgment: Judgment normal.                        Assessment & Plan  1.  Left inguinal hernia: The patient has a left inguinal hernia that is moderate in size and appears to contain bowel.  It is at risk of becoming incarcerated.  It was discussed with him.  Approaches to inguinal hernia repair were also discussed with him.  He will be scheduled for da Suleiman robot-assisted laparoscopic left inguinal hernia repair, possible bilateral inguinal hernia repairs.  The patient understands the indications, alternatives, risks, and benefits of the procedure and wishes to proceed.

## 2024-05-03 NOTE — DISCHARGE INSTRUCTIONS
Dr. Smith Lamb   [address]  [place], [place] [address] [phone]    Discharge Instructions for Hernia Surgery      Go home, rest and take it easy today; however, you should get up and move about several times today to reduce the risk of developing a clot in your legs.      You may experience some dizziness or memory loss from the anesthesia.  This may last for the next 24 hours.  Someone should plan on staying with you for the first 24 hours for your safety.    Do not make any important legal decisions or sign any legal papers for the next 24 hours.      Eat and drink lightly today.  Start off with liquids, jello, soup, crackers or other bland foods at first. You may advance your diet tomorrow as tolerated as long as you do not experience any nausea or vomiting.     You may remove your outer dressings in 2 days.  The white tapes called steri-strips should stay in place.  They will fall off on their own in 1-2 weeks.  Do not worry if they come off sooner.      You may notice some bleeding/drainage on your outer dressings. A little bloody drainage is normal. If the bleeding/drainage is such that the bandage cannot absorb it, remove the dressing, apply clean gauze and apply firm pressure for a full 15 minutes.  If the bleeding continues, please call me.    You may shower tomorrow.  No tub baths until your incisions are completely healed.     No lifting > 20 lbs. until you are seen at your follow-up visit.         You have received a prescription for a narcotic pain medicine, as you will have some pain following surgery.   You will not be totally pain free, but your pain medicine should make the pain tolerable.  Please take your pain medicine as prescribed and always take your pills with food to prevent nausea. If you are having severe pain that cannot be controlled by the pain medicine, please contact me.      If you had a laparoscopic surgery, it is not unusual to experience pain/discomfort in your  shoulders or under your ribs after surgery.  It is from the gas used during the laparoscopic procedure and usually lasts 1-3 days.  The prescription pain medicine is used to treat the surgical pain and does not typically alleviate this “gassy” pain.     No driving for 24 hours and for as long as you are taking your prescription pain medicine.      You will need to call the office at [phone] to schedule a follow-up appointment in 2 weeks.           Remember to contact me for any of the following:    Fever > 101 degrees  Severe pain that cannot be controlled by taking your pain pills  Severe nausea or vomiting   Significant bleeding of your incisions  Drainage that has a bad smell or is yellow or green in appearance  Any other questions or concerns        Additional Instruction for Inguinal Hernia Patients Only    If you did not urinate at the hospital after your surgery or if you feel the need to urinate and cannot, this will necessitate a return to the Emergency Room for placement of a urinary catheter.  You should also notify me as well.  As a rule, you should be able to empty your bladder within 4-6 hours after discharge from the hospital.      You may notice some scrotal bruising and/or swelling. A scrotal support or briefs as well as ice packs may be used to alleviate discomfort

## 2024-05-28 ENCOUNTER — OFFICE VISIT (OUTPATIENT)
Dept: SURGERY | Facility: CLINIC | Age: 69
End: 2024-05-28
Payer: MEDICARE

## 2024-05-28 VITALS
BODY MASS INDEX: 27.55 KG/M2 | WEIGHT: 171.4 LBS | HEIGHT: 66 IN | DIASTOLIC BLOOD PRESSURE: 88 MMHG | SYSTOLIC BLOOD PRESSURE: 138 MMHG

## 2024-05-28 DIAGNOSIS — Z48.89 POSTOPERATIVE VISIT: Primary | ICD-10-CM

## 2024-05-28 PROCEDURE — 1160F RVW MEDS BY RX/DR IN RCRD: CPT | Performed by: SURGERY

## 2024-05-28 PROCEDURE — 99024 POSTOP FOLLOW-UP VISIT: CPT | Performed by: SURGERY

## 2024-05-28 PROCEDURE — 1159F MED LIST DOCD IN RCRD: CPT | Performed by: SURGERY

## 2024-05-28 NOTE — LETTER
May 28, 2024     Adolph Dickey MD     Patient: Vito Rodriguez   YOB: 1955   Date of Visit: 5/28/2024     Dear Adolph Dickey MD:       Thank you for referring Vito Rodriguez to me for evaluation. Below are the relevant portions of my assessment and plan of care.    If you have questions, please do not hesitate to call me. I look forward to following Vito along with you.         Sincerely,        Smith Lamb Jr., MD        CC:   No Recipients    Smith Lamb Jr., MD  05/28/24 1738  Sign when Signing Visit  Subjective  Vito Rodriguez is a 68 y.o. male who returns to the office after undergoing a da Suleiman robot-assisted laparoscopic left inguinal hernia repair on 5/3/2024.     History of Present Illness     The patient is recovering well with no significant postop symptoms.  He is having no abdominal pain.  He has a good appetite and normal bowel function.  His energy level is good.      Review of Systems   Constitutional:  Negative for activity change, appetite change, fatigue and fever.   Respiratory:  Negative for chest tightness and shortness of breath.    Cardiovascular:  Negative for chest pain and palpitations.   Gastrointestinal:  Negative for abdominal pain, constipation, diarrhea and nausea.   Skin:  Negative for rash and wound.   Psychiatric/Behavioral:  Negative for agitation and confusion.        Objective  Physical Exam  Constitutional:       General: He is not in acute distress.     Appearance: Normal appearance. He is not ill-appearing or toxic-appearing.   Pulmonary:      Effort: Pulmonary effort is normal. No respiratory distress.   Abdominal:      Palpations: Abdomen is soft.      Tenderness: There is no abdominal tenderness.   Skin:     Comments: Incision: intact with no evidence of infection.   Neurological:      Mental Status: He is alert.   Psychiatric:         Behavior: Behavior normal.                 Assessment & Plan    1.  Postoperative visit: The patient is  recovering well from his da Suleiman robot-assisted laparoscopic left inguinal hernia repair.  At this point he has no limitations.  He will follow-up on an as-needed basis.

## 2024-05-28 NOTE — PROGRESS NOTES
Raymond Rodriguez is a 68 y.o. male who returns to the office after undergoing a da Suleiman robot-assisted laparoscopic left inguinal hernia repair on 5/3/2024.     History of Present Illness     The patient is recovering well with no significant postop symptoms.  He is having no abdominal pain.  He has a good appetite and normal bowel function.  His energy level is good.      Review of Systems   Constitutional:  Negative for activity change, appetite change, fatigue and fever.   Respiratory:  Negative for chest tightness and shortness of breath.    Cardiovascular:  Negative for chest pain and palpitations.   Gastrointestinal:  Negative for abdominal pain, constipation, diarrhea and nausea.   Skin:  Negative for rash and wound.   Psychiatric/Behavioral:  Negative for agitation and confusion.        Objective   Physical Exam  Constitutional:       General: He is not in acute distress.     Appearance: Normal appearance. He is not ill-appearing or toxic-appearing.   Pulmonary:      Effort: Pulmonary effort is normal. No respiratory distress.   Abdominal:      Palpations: Abdomen is soft.      Tenderness: There is no abdominal tenderness.   Skin:     Comments: Incision: intact with no evidence of infection.   Neurological:      Mental Status: He is alert.   Psychiatric:         Behavior: Behavior normal.                 Assessment & Plan     1.  Postoperative visit: The patient is recovering well from his da Suleiman robot-assisted laparoscopic left inguinal hernia repair.  At this point he has no limitations.  He will follow-up on an as-needed basis.

## 2025-05-16 ENCOUNTER — OFFICE VISIT (OUTPATIENT)
Dept: INTERNAL MEDICINE | Facility: CLINIC | Age: 70
End: 2025-05-16
Payer: MEDICARE

## 2025-05-16 VITALS
HEIGHT: 66 IN | WEIGHT: 178 LBS | DIASTOLIC BLOOD PRESSURE: 92 MMHG | HEART RATE: 62 BPM | BODY MASS INDEX: 28.61 KG/M2 | SYSTOLIC BLOOD PRESSURE: 156 MMHG | OXYGEN SATURATION: 96 %

## 2025-05-16 DIAGNOSIS — E78.5 HYPERLIPIDEMIA, UNSPECIFIED HYPERLIPIDEMIA TYPE: ICD-10-CM

## 2025-05-16 DIAGNOSIS — Z12.5 SCREENING FOR PROSTATE CANCER: ICD-10-CM

## 2025-05-16 DIAGNOSIS — J45.22 MILD INTERMITTENT ASTHMA WITH STATUS ASTHMATICUS: ICD-10-CM

## 2025-05-16 DIAGNOSIS — R05.3 CHRONIC COUGH: Primary | ICD-10-CM

## 2025-05-16 RX ORDER — FLUTICASONE PROPIONATE AND SALMETEROL XINAFOATE 115; 21 UG/1; UG/1
2 AEROSOL, METERED RESPIRATORY (INHALATION) EVERY 12 HOURS
COMMUNITY

## 2025-05-16 NOTE — PROGRESS NOTES
New Patient Office Visit      Patient Name: Vito Rodriguez  : 1955   MRN: [id number]   Care Team: Patient Care Team:  Marciano Lopez MD as PCP - General (Internal Medicine)    Chief Complaint:    Chief Complaint   Patient presents with    Establish Care     Upper respiratory issues.    Asthma       History of Present Illness: Vito Rodriguez is a 69 y.o. male who is here today to establish care.      He was last seen by PCP about 3 years ago    His primary complaint and concern today is chronic asthma and chronic cough.  He states that it has been ongoing for about the past 2 to 3 years, symptoms worsened after getting COVID in .  He currently is on Advair for asthma, also takes pantoprazole for reflux, was previously on Trelegy but switched to Advair.  He currently follows with an allergist after he did allergy injections for over 8 years.  He states that his cough is largely worse in the morning as well as at night, denies any fevers, chest pain    He otherwise reports no other significant past medical history    Subjective      Review of Systems:   Review of Systems - See HPI    Past Medical History:   Past Medical History:   Diagnosis Date    Asthma     Cataract     BILAT: SCHEDULED FOR SURGERY THIS SUMMER    Decreased hearing of both ears     DOES NOT WEAR HIS HEARING AIDS    Diverticulitis of colon 22    Recent colonocopy by Dr. Rodriguez    GERD (gastroesophageal reflux disease)     Hx of colonic polyp     Left inguinal hernia     Low back pain     Tinnitus     BILAT       Past Surgical History:   Past Surgical History:   Procedure Laterality Date    COLONOSCOPY N/A 2016    Procedure: COLONOSCOPY to cecum;  Surgeon: Charisma Rodriguez MD;  Location: [facility] ENDOSCOPY;  Service:     COLONOSCOPY N/A 2024    Procedure: COLONOSCOPY TO CECUM WITH POLYPECTOMY;  Surgeon: Charisma Rodriguez MD;  Location: [facility] MAIN OR;  Service: Gastroenterology;  Laterality: N/A;  diverticulosis,  polyp    COLONOSCOPY W/ POLYPECTOMY      ELBOW OPEN REDUCTION INTERNAL FIXATION Left     with one pin    ENDOSCOPY N/A 2016    Procedure: ESOPHAGOGASTRODUODENOSCOPY with biopsies;  Surgeon: Charisma Rodriguez MD;  Location: [facility] ENDOSCOPY;  Service:     ENDOSCOPY N/A 2024    Procedure: ESOPHAGOGASTRODUODENOSCOPY WITH BIOPSY AND DILATION;  Surgeon: Charisma Rodriguez MD;  Location: [facility] MAIN OR;  Service: Gastroenterology;  Laterality: N/A;  15-18mm balloon used. esophageal stricture, duodenitis, abnormal gastric mucosa    EYE SURGERY  2024    Right eye  Left eye     FRACTURE SURGERY  Age 53    ? Torn meniscus rt knee    INGUINAL HERNIA REPAIR Right     12 y/o    INGUINAL HERNIA REPAIR Left 2024    Procedure: Davinci assisted laparoscopic left inguinal hernia repair;  Surgeon: Smith Lamb Jr., MD;  Location: [facility] MAIN OR;  Service: Robotics - DaVinci;  Laterality: Left;    MENISCECTOMY Right        Family History:   Family History   Problem Relation Age of Onset    Diabetes Mother         Type 2    Stroke Mother         Stroke , Smoker Alzheimers  initial, lasted 6 years    Dementia Mother     Heart disease Father         smoker    Alzheimer's disease Father 94    Diabetes Father         Type 2    Stroke Father         Stroke Age 72, Smoker, High Blood sugar    Anxiety disorder Father     No Known Problems Sister     No Known Problems Brother     Cirrhosis Brother     Alcohol abuse Brother          age 72 from cirrosis    No Known Problems Brother     Other Maternal Aunt 50        smoker, unknown    Prostate cancer Neg Hx     Colon cancer Neg Hx     Breast cancer Neg Hx     Malig Hyperthermia Neg Hx        Social History:   Social History     Socioeconomic History    Marital status:    Tobacco Use    Smoking status: Never     Passive exposure: Never    Smokeless tobacco: Never    Tobacco comments:     Smoked briefly Age 5   Vaping Use    Vaping status: Never  "Used   Substance and Sexual Activity    Alcohol use: Yes     Alcohol/week: 3.0 standard drinks of alcohol     Types: 1 Glasses of wine, 2 Cans of beer per week     Comment: Wine or Beer 1-2 per week    Drug use: No    Sexual activity: Yes     Partners: Female     Birth control/protection: Vasectomy     Comment: Heterosexual       Tobacco History:   Social History     Tobacco Use   Smoking Status Never    Passive exposure: Never   Smokeless Tobacco Never   Tobacco Comments    Smoked briefly Age 5       Medications:     Current Outpatient Medications:     Cetirizine HCl 10 MG capsule, Take  by mouth Daily., Disp: , Rfl:     fluticasone-salmeterol (Advair HFA) 115-21 MCG/ACT inhaler, Inhale 2 puffs Every 12 (Twelve) Hours., Disp: , Rfl:     montelukast (SINGULAIR) 10 MG tablet, Take 1 tablet by mouth Every Night., Disp: , Rfl:     pantoprazole (PROTONIX) 40 MG EC tablet, Take 1 tablet by mouth 2 (Two) Times a Day., Disp: , Rfl:     promethazine-dextromethorphan (PROMETHAZINE-DM) 6.25-15 MG/5ML syrup, Take 5 mL by mouth 4 (Four) Times a Day As Needed for Cough. (Patient not taking: Reported on 5/16/2025), Disp: 119 mL, Rfl: 0    Allergies:   No Known Allergies    Objective     Physical Exam:  Vital Signs:   Vitals:    05/16/25 1024   BP: 156/92   Pulse: 62   SpO2: 96%   Weight: 80.7 kg (178 lb)   Height: 167.6 cm (65.98\")   PainSc: 0-No pain     Body mass index is 28.74 kg/m².     Physical Exam  Vitals reviewed.   Constitutional:       General: He is not in acute distress.     Appearance: Normal appearance. He is not toxic-appearing.   HENT:      Head: Normocephalic and atraumatic.   Eyes:      General: No scleral icterus.     Conjunctiva/sclera: Conjunctivae normal.   Cardiovascular:      Rate and Rhythm: Normal rate and regular rhythm.      Heart sounds: Normal heart sounds.   Pulmonary:      Effort: Pulmonary effort is normal. No respiratory distress.      Breath sounds: Normal breath sounds. No wheezing or rales. "   Skin:     General: Skin is warm and dry.   Neurological:      Mental Status: He is alert and oriented to person, place, and time.   Psychiatric:         Mood and Affect: Mood normal.         Behavior: Behavior normal.         Assessment / Plan      Assessment/Plan:   Problems Addressed This Visit  Diagnoses and all orders for this visit:    1. Chronic cough (Primary)  -     CBC & Differential; Future  -     Comprehensive Metabolic Panel; Future  -     CT Chest Without Contrast; Future    2. Mild intermittent asthma with status asthmaticus    3. Hyperlipidemia, unspecified hyperlipidemia type  -     Comprehensive Metabolic Panel; Future  -     Lipid Panel With / Chol / HDL Ratio; Future    4. Screening for prostate cancer  -     PSA Screen; Future      Reviewed previous PCP office visit in 2020, labs dating back to 2020, previous chest x-ray and CT coronary heart    Reports chronic cough with history of asthma, chest x-ray done in 2023 was ultimately unremarkable, will plan to obtain CT scan for further evaluation of lung parenchyma.  Ultimately if CT scan is unremarkable he may benefit from pulmonary evaluation but in the meantime continue Advair and albuterol as needed he has not had any significant flares since then    Will have him return to clinic in about 4 months for annual wellness, labs prior        Plan of care reviewed with patient at the conclusion of today's visit. Education was provided regarding diagnosis and management.  Patient verbalizes understanding of and agreement with management plan.      Follow Up:   Return in about 4 months (around 9/16/2025) for Medicare Wellness, Lab before FU.          Marciano Lopez MD  [facility]  [address] [place] [phone]  Fax [phone]

## 2025-06-05 ENCOUNTER — HOSPITAL ENCOUNTER (OUTPATIENT)
Dept: CT IMAGING | Facility: HOSPITAL | Age: 70
Discharge: HOME | End: 2025-06-05
Admitting: STUDENT IN AN ORGANIZED HEALTH CARE EDUCATION/TRAINING PROGRAM
Payer: MEDICARE

## 2025-06-05 DIAGNOSIS — R05.3 CHRONIC COUGH: ICD-10-CM

## 2025-06-05 PROCEDURE — 71250 CT THORAX DX C-: CPT

## 2025-06-11 ENCOUNTER — TELEPHONE (OUTPATIENT)
Dept: INTERNAL MEDICINE | Facility: CLINIC | Age: 70
End: 2025-06-11
Payer: MEDICARE

## 2025-06-11 NOTE — TELEPHONE ENCOUNTER
FYI:   Patient returned call to [facility] regarding his Ct Chest, patient was given the message from Dr. Lopez and was satisfied, patient had no additional questions or concerns.    Patient to IR via cart.

## (undated) DEVICE — TIP COVER ACCESSORY

## (undated) DEVICE — THE STERILE LIGHT HANDLE COVER IS USED WITH STERIS SURGICAL LIGHTING AND VISUALIZATION SYSTEMS.

## (undated) DEVICE — VIAL FORMLN CAP 10PCT 20ML

## (undated) DEVICE — LINER CANSTR SXN MEDIVAC FLX/ADV 1500ML

## (undated) DEVICE — LOU GENERAL ROBOT: Brand: MEDLINE INDUSTRIES, INC.

## (undated) DEVICE — VIOLET BRAIDED (POLYGLACTIN 910), SYNTHETIC ABSORBABLE SUTURE: Brand: COATED VICRYL

## (undated) DEVICE — ADAPT CLN SCPE ENDO PORPOISE BX/50 DISP

## (undated) DEVICE — Device

## (undated) DEVICE — SUT MNCRYL PLS ANTIB UD 4/0 PS2 18IN

## (undated) DEVICE — GLV SURG PREMIERPRO ORTHO LTX PF SZ7.5 BRN

## (undated) DEVICE — BALN DIL ESOPH CRE CONTRL RADL 15/18MM 8CM

## (undated) DEVICE — STRIP,CLOSURE,WOUND,MEDI-STRIP,1/2X4: Brand: MEDLINE

## (undated) DEVICE — COLUMN DRAPE

## (undated) DEVICE — SEAL

## (undated) DEVICE — SOL ANTISTICK CAUTRY ELECTROLUBE LF

## (undated) DEVICE — FRCP BX RADJAW4 NDL 2.8 240CM LG OG BX80

## (undated) DEVICE — DEV INFL ALLIANCE2 SYS

## (undated) DEVICE — SPNG BULKEE SUPERFLUFF 6X6.75IN LF STRL PK/5

## (undated) DEVICE — PATIENT RETURN ELECTRODE, SINGLE-USE, CONTACT QUALITY MONITORING, ADULT, WITH 9FT CORD, FOR PATIENTS WEIGING OVER 33LBS. (15KG): Brand: MEGADYNE

## (undated) DEVICE — ENDOPATH PNEUMONEEDLE INSUFFLATION NEEDLES WITH LUER LOCK CONNECTORS 120MM: Brand: ENDOPATH

## (undated) DEVICE — ADHS LIQ MASTISOL 2/3ML

## (undated) DEVICE — COVER,MAYO STAND,STERILE: Brand: MEDLINE

## (undated) DEVICE — BLADELESS OBTURATOR: Brand: WECK VISTA

## (undated) DEVICE — GOWN ISOL W/THUMB UNIV BLU BX/15

## (undated) DEVICE — ARM DRAPE

## (undated) DEVICE — KT ORCA ORCAPOD DISP STRL

## (undated) DEVICE — SYR LUER SLPTP 60ML

## (undated) DEVICE — MSK ENDO PORT O2 POM ELITE CURAPLEX A/

## (undated) DEVICE — BITEBLOCK OMNI BLOC